# Patient Record
Sex: FEMALE | Race: WHITE | NOT HISPANIC OR LATINO | Employment: UNEMPLOYED | URBAN - METROPOLITAN AREA
[De-identification: names, ages, dates, MRNs, and addresses within clinical notes are randomized per-mention and may not be internally consistent; named-entity substitution may affect disease eponyms.]

---

## 2017-01-26 ENCOUNTER — OFFICE VISIT (OUTPATIENT)
Dept: FAMILY MEDICINE | Facility: CLINIC | Age: 47
End: 2017-01-26
Payer: COMMERCIAL

## 2017-01-26 VITALS
BODY MASS INDEX: 24.59 KG/M2 | RESPIRATION RATE: 16 BRPM | HEIGHT: 64 IN | DIASTOLIC BLOOD PRESSURE: 80 MMHG | TEMPERATURE: 99.1 F | HEART RATE: 68 BPM | OXYGEN SATURATION: 98 % | WEIGHT: 144 LBS | SYSTOLIC BLOOD PRESSURE: 120 MMHG

## 2017-01-26 DIAGNOSIS — Z12.4 SCREENING FOR MALIGNANT NEOPLASM OF CERVIX: ICD-10-CM

## 2017-01-26 DIAGNOSIS — Z01.411 ENCOUNTER FOR GYNECOLOGICAL EXAMINATION WITH ABNORMAL FINDING: Primary | ICD-10-CM

## 2017-01-26 DIAGNOSIS — Z23 NEED FOR PROPHYLACTIC VACCINATION AND INOCULATION AGAINST INFLUENZA: ICD-10-CM

## 2017-01-26 DIAGNOSIS — Z23 NEED FOR DIPHTHERIA-TETANUS-PERTUSSIS (TDAP) VACCINE, ADULT/ADOLESCENT: ICD-10-CM

## 2017-01-26 DIAGNOSIS — F10.20 ALCOHOL DEPENDENCE, CONTINUOUS DRINKING BEHAVIOR (H): ICD-10-CM

## 2017-01-26 PROBLEM — Z13.6 CARDIOVASCULAR SCREENING; LDL GOAL LESS THAN 160: Status: ACTIVE | Noted: 2017-01-26

## 2017-01-26 LAB
ERYTHROCYTE [DISTWIDTH] IN BLOOD BY AUTOMATED COUNT: 16.1 % (ref 10–15)
HCT VFR BLD AUTO: 29.4 % (ref 35–47)
HGB BLD-MCNC: 10.5 G/DL (ref 11.7–15.7)
MCH RBC QN AUTO: 40.1 PG (ref 26.5–33)
MCHC RBC AUTO-ENTMCNC: 35.7 G/DL (ref 31.5–36.5)
MCV RBC AUTO: 112 FL (ref 78–100)
PLATELET # BLD AUTO: 93 10E9/L (ref 150–450)
RBC # BLD AUTO: 2.62 10E12/L (ref 3.8–5.2)
WBC # BLD AUTO: 4.9 10E9/L (ref 4–11)

## 2017-01-26 PROCEDURE — 85027 COMPLETE CBC AUTOMATED: CPT | Performed by: NURSE PRACTITIONER

## 2017-01-26 PROCEDURE — 80061 LIPID PANEL: CPT | Performed by: NURSE PRACTITIONER

## 2017-01-26 PROCEDURE — 82306 VITAMIN D 25 HYDROXY: CPT | Performed by: NURSE PRACTITIONER

## 2017-01-26 PROCEDURE — 90471 IMMUNIZATION ADMIN: CPT | Performed by: NURSE PRACTITIONER

## 2017-01-26 PROCEDURE — 87624 HPV HI-RISK TYP POOLED RSLT: CPT | Performed by: NURSE PRACTITIONER

## 2017-01-26 PROCEDURE — 84443 ASSAY THYROID STIM HORMONE: CPT | Performed by: NURSE PRACTITIONER

## 2017-01-26 PROCEDURE — 90686 IIV4 VACC NO PRSV 0.5 ML IM: CPT | Performed by: NURSE PRACTITIONER

## 2017-01-26 PROCEDURE — G0145 SCR C/V CYTO,THINLAYER,RESCR: HCPCS | Performed by: NURSE PRACTITIONER

## 2017-01-26 PROCEDURE — 36415 COLL VENOUS BLD VENIPUNCTURE: CPT | Performed by: NURSE PRACTITIONER

## 2017-01-26 PROCEDURE — 80053 COMPREHEN METABOLIC PANEL: CPT | Performed by: NURSE PRACTITIONER

## 2017-01-26 PROCEDURE — 90472 IMMUNIZATION ADMIN EACH ADD: CPT | Performed by: NURSE PRACTITIONER

## 2017-01-26 PROCEDURE — 99386 PREV VISIT NEW AGE 40-64: CPT | Mod: 25 | Performed by: NURSE PRACTITIONER

## 2017-01-26 PROCEDURE — 90715 TDAP VACCINE 7 YRS/> IM: CPT | Performed by: NURSE PRACTITIONER

## 2017-01-26 RX ORDER — DIAZEPAM 5 MG
5 TABLET ORAL PRN
COMMUNITY
End: 2017-09-22

## 2017-01-26 NOTE — PROGRESS NOTES
SUBJECTIVE:     CC: Chanda Garvey is an 46 year old woman who presents for preventive health visit.     Healthy Habits:    Do you get at least three servings of calcium containing foods daily (dairy, green leafy vegetables, etc.)? Not enough    Amount of exercise or daily activities, outside of work: 2-3 day(s) per week    Problems taking medications regularly No    Medication side effects: No    Have you had an eye exam in the past two years? no    Do you see a dentist twice per year? Once a year    Do you have sleep apnea, excessive snoring or daytime drowsiness?no      Today's PHQ-2 Score: No flowsheet data found.    Abuse: Current or Past(Physical, Sexual or Emotional)- No  Do you feel safe in your environment - Yes    Social History   Substance Use Topics     Smoking status: Former Smoker -- 1.00 packs/day for 10 years     Types: Cigarettes     Quit date: 05/01/2001     Smokeless tobacco: Not on file     Alcohol Use: Yes      Comment: 5 drinks per week          Standardized Alcohol Screening Questionnaire  AUDIT   Questions 0 1 2 3 4 Score   1. How often do you have a drink  containing alcohol? Never Monthly or less 2 to 4  times a  month 2 to 3  times a  week 4 or more  times a  week  4   2. How many drinks containing alcohol  do you have on a typical day when you are drinking? 1 or 2 3 or 4 5 or 6 7 to 9 10 or more  1   3. How often do you have more than five  or more drinks on one occasion? Never Less  than  monthly Monthly Weekly Daily or  almost  daily  4   4. How often during the last year have  you found that you were not able to stop drinking once you had started? Never Less  than  monthly Monthly Weekly Daily or  almost  daily  3   5. How often during the last year have  you failed to do what was normally expected of you because of drinking? Never Less  than  monthly Monthly Weekly Daily or  almost  daily  3   6. How often during the last year have  you needed a first drink in the morning to  get yourself going after a heavy drinking session? Never Less  than  monthly Monthly Weekly Daily or  almost  daily  3   7. How often during the last year have you had a feeling of guilt or remorse after drinking? Never Less  than  monthly Monthly Weekly Daily or  almost  daily  2   8. How often during the last year have  you been unable to remember what happened the night before because of your drinking? Never Less  than  monthly Monthly Weekly Daily or  almost  daily  2   9. Have you or someone else been  injured because of your drinking? No  Yes, but not in the last year  Yes,  during the  last year  0   10. Has a relative, friend, doctor or other health care worker been concerned about your drinking or suggested you cut down? No  Yes, but not in the last year  Yes,  during the  last year  4   Total  26   Scoring: A score of 7 for adult men is an indication of hazardous drinking (risk for physical or physiological harm); a score of 8 or more is an indication of an alcohol use disorder. A score of 5 or more for adult women  is an indication of hazardous drinking or an alcohol use disorder.       No results for input(s): CHOL, HDL, LDL, TRIG, CHOLHDLRATIO, NHDL in the last 42454 hours.    Reviewed orders with patient.  Reviewed health maintenance and updated orders accordingly - Yes    Sexually active, male  partners,  Would not like std screening.  Is using female surgical for contraception.      Works at [employer] to 6 yo son and 14 yo daughter.     Does not exercise regularly.  Does  not eat a well balanced diet including plenty of fruits, vegetables, calcium and proteins.      Reports she has been drinking a lot of alcohol.  5 or more drinks of vodka daily.  Started escalating 1 year ago whe her son started 1/2 day .  Noticed her eyes were yellow a few days ago.  Sought help through an addiction specialist, taking antabuse and valium for withdrawal.  Sober day #2.   Establishing with a counselor.  ,  " supportive.  Feeling relief that she can talk about was feeling very isolated.  Not eating, drinking all her calories.  Thinks she must be very vitamin deficient.         Mammo Decision Support:  Patient under age 50, mutual decision reflected in health maintenance.      Pertinent mammograms are reviewed under the imaging tab.  History of abnormal Pap smear: NO - age 30-65 PAP every 5 years with negative HPV co-testing recommended  All Histories reviewed and updated in Epic.    ROS:  C: NEGATIVE for fever, chills, change in weight  I: NEGATIVE for worrisome rashes, moles or lesions  E: NEGATIVE for vision changes or irritation  ENT: NEGATIVE for ear, mouth and throat problems  R: NEGATIVE for significant cough or SOB  B: NEGATIVE for masses, tenderness or discharge  CV: NEGATIVE for chest pain, palpitations or peripheral edema  GI: NEGATIVE for nausea, abdominal pain, heartburn, or change in bowel habits  : NEGATIVE for unusual urinary or vaginal symptoms. No vaginal bleeding.  M: NEGATIVE for significant arthralgias or myalgia  N: NEGATIVE for weakness, dizziness or paresthesias  P: NEGATIVE for changes in mood or affect     Problem list, Medication list, Allergies, and Medical/Social/Surgical histories [facility] and updated as appropriate.  Labs reviewed in EPIC  BP Readings from Last 3 Encounters:   01/26/17 120/80   06/06/16 140/96   11/03/04 106/66    Wt Readings from Last 3 Encounters:   01/26/17 144 lb (65.318 kg)   06/06/16 150 lb (68.04 kg)   11/03/04 117 lb 12 oz (53.411 kg)             OBJECTIVE:     /80 mmHg  Pulse 68  Temp(Src) 99.1  F (37.3  C) (Oral)  Resp 16  Ht 5' 4\" (1.626 m)  Wt 144 lb (65.318 kg)  BMI 24.71 kg/m2  SpO2 98%  EXAM:  GENERAL APPEARANCE: healthy, alert and no distress  EYES: Eyes grossly normal to inspection, PERRL and conjunctivae and sclerae Jaundiced  HENT: ear canals and TM's normal, nose and mouth without ulcers or lesions, oropharynx clear and oral " mucous membranes moist  NECK: no adenopathy, no asymmetry, masses, or scars and thyroid normal to palpation  RESP: lungs clear to auscultation - no rales, rhonchi or wheezes  BREAST: normal without masses, tenderness or nipple discharge and no palpable axillary masses or adenopathy  CV: regular rate and rhythm, normal S1 S2, no S3 or S4, no murmur, click or rub, no peripheral edema and peripheral pulses strong  ABDOMEN: soft, nontender, marked hepatosplenomegaly, no masses and bowel sounds normal  MS: no musculoskeletal defects are noted and gait is age appropriate without ataxia  SKIN: face and chest jaundiced. no suspicious lesions or rashes  NEURO: Normal strength and tone, sensory exam grossly normal, mentation intact and speech normal  PSYCH: mentation appears normal and affect normal/bright    ASSESSMENT/PLAN:         ICD-10-CM    1. Encounter for gynecological examination with abnormal finding Z01.411    2. Alcohol dependence, continuous drinking behavior (H) F10.20 TSH with free T4 reflex     Comprehensive metabolic panel     CBC with platelets   3. Need for prophylactic vaccination and inoculation against influenza Z23 FLU VAC, SPLIT VIRUS IM > 3 YO (QUADRIVALENT) [51107]     ADMIN 1st VACCINE   4. Screening for malignant neoplasm of cervix Z12.4 Lipid panel reflex to direct LDL     Vitamin D Deficiency     HPV High Risk Types DNA Cervical     Pap imaged thin layer screen with HPV - recommended age 30 - 65 years (select HPV order below)     CANCELED: GLUCOSE   5. Need for diphtheria-tetanus-pertussis (Tdap) vaccine, adult/adolescent Z23 TDAP (ADACEL AGES 11-64)     Vaccine Administration, Each Additional [02857]       COUNSELING:   Reviewed preventive health counseling, as reflected in patient instructions         reports that she has quit smoking. Her smoking use included Cigarettes. She has a 10 pack-year smoking history. She does not have any smokeless tobacco history on file.    Estimated body mass  "index is 25.36 kg/(m^2) as calculated from the following:    Height as of 6/6/16: 5' 4.5\" (1.638 m).    Weight as of 6/6/16: 150 lb (68.04 kg).      well female, not fasting for labs.  Encouraged to start/establish exercise and healthy diet choices.  Pap done.      Encouraged sobriety, use any available supports, family, friends, counseling etc.  Continue with psychiatrist.      Flu and adacel given.      Counseling Resources:  ATP IV Guidelines  Pooled Cohorts Equation Calculator  Breast Cancer Risk Calculator  FRAX Risk Assessment  ICSI Preventive Guidelines  Dietary Guidelines for Americans, 2010  [employer]'s MyPlate  ASA Prophylaxis  Lung CA Screening    NE Joyner CNP  [facility]    Injectable Influenza Immunization Documentation    1.  Is the person to be vaccinated sick today?  No    2. Does the person to be vaccinated have an allergy to eggs or to a component of the vaccine?  No    3. Has the person to be vaccinated today ever had a serious reaction to influenza vaccine in the past?  No    4. Has the person to be vaccinated ever had Guillain-Finlayson syndrome?  No     Form completed by Nicol Munoz [facility]      Screening Questionnaire for Adult Immunization    Are you sick today?   No   Do you have allergies to medications, food, a vaccine component or latex?   Yes   Have you ever had a serious reaction after receiving a vaccination?   No   Do you have a long-term health problem with heart disease, lung disease, asthma, kidney disease, metabolic disease (e.g. diabetes), anemia, or other blood disorder?   No   Do you have cancer, leukemia, HIV/AIDS, or any other immune system problem?   No   In the past 3 months, have you taken medications that affect  your immune system, such as prednisone, other steroids, or anticancer drugs; drugs for the treatment of rheumatoid arthritis, Crohn s disease, or psoriasis; or have you had radiation treatments?   No   Have you had a seizure, or a brain " or other nervous system problem?   No   During the past year, have you received a transfusion of blood or blood     products, or been given immune (gamma) globulin or antiviral drug?   No   For women: Are you pregnant or is there a chance you could become        pregnant during the next month?   No   Have you received any vaccinations in the past 4 weeks?   No     Immunization questionnaire was positive for at least one answer.  Notified oJnah.      MNVFC doesn't apply on this patient    Per orders of Dr. Mckenzie, injection of Adacel given by Nicol Munoz. Patient instructed to remain in clinic for 20 minutes afterwards, and to report any adverse reaction to me immediately.  VIS given     Screening performed by Nicol Munoz on 1/26/2017 at 1:43 PM.

## 2017-01-26 NOTE — NURSING NOTE
"Chief Complaint   Patient presents with     Physical       Initial /80 mmHg  Pulse 68  Temp(Src) 99.1  F (37.3  C) (Oral)  Resp 16  Ht 5' 4\" (1.626 m)  Wt 144 lb (65.318 kg)  BMI 24.71 kg/m2  SpO2 98% Estimated body mass index is 24.71 kg/(m^2) as calculated from the following:    Height as of this encounter: 5' 4\" (1.626 m).    Weight as of this encounter: 144 lb (65.318 kg).  BP completed using cuff size: marissa Munoz CMA      "

## 2017-01-27 LAB
ALBUMIN SERPL-MCNC: 2.7 G/DL (ref 3.4–5)
ALP SERPL-CCNC: 292 U/L (ref 40–150)
ALT SERPL W P-5'-P-CCNC: 80 U/L (ref 0–50)
ANION GAP SERPL CALCULATED.3IONS-SCNC: 11 MMOL/L (ref 3–14)
AST SERPL W P-5'-P-CCNC: 366 U/L (ref 0–45)
BILIRUB SERPL-MCNC: 13 MG/DL (ref 0.2–1.3)
BUN SERPL-MCNC: 4 MG/DL (ref 7–30)
CALCIUM SERPL-MCNC: 8.4 MG/DL (ref 8.5–10.1)
CHLORIDE SERPL-SCNC: 93 MMOL/L (ref 94–109)
CHOLEST SERPL-MCNC: 151 MG/DL
CO2 SERPL-SCNC: 29 MMOL/L (ref 20–32)
CREAT SERPL-MCNC: 0.48 MG/DL (ref 0.52–1.04)
DEPRECATED CALCIDIOL+CALCIFEROL SERPL-MC: 36 UG/L (ref 20–75)
GFR SERPL CREATININE-BSD FRML MDRD: ABNORMAL ML/MIN/1.7M2
GLUCOSE SERPL-MCNC: 127 MG/DL (ref 70–99)
HDLC SERPL-MCNC: 18 MG/DL
LDLC SERPL CALC-MCNC: 85 MG/DL
NONHDLC SERPL-MCNC: 133 MG/DL
POTASSIUM SERPL-SCNC: 3.2 MMOL/L (ref 3.4–5.3)
PROT SERPL-MCNC: 7.5 G/DL (ref 6.8–8.8)
SODIUM SERPL-SCNC: 133 MMOL/L (ref 133–144)
TRIGL SERPL-MCNC: 240 MG/DL
TSH SERPL DL<=0.005 MIU/L-ACNC: 3.77 MU/L (ref 0.4–4)

## 2017-01-31 LAB
COPATH REPORT: NORMAL
PAP: NORMAL

## 2017-02-01 LAB
FINAL DIAGNOSIS: NORMAL
HPV HR 12 DNA CVX QL NAA+PROBE: NEGATIVE
HPV16 DNA SPEC QL NAA+PROBE: NEGATIVE
HPV18 DNA SPEC QL NAA+PROBE: NEGATIVE
SPECIMEN DESCRIPTION: NORMAL

## 2017-02-03 ENCOUNTER — OFFICE VISIT (OUTPATIENT)
Dept: FAMILY MEDICINE | Facility: CLINIC | Age: 47
End: 2017-02-03
Payer: COMMERCIAL

## 2017-02-03 ENCOUNTER — TELEPHONE (OUTPATIENT)
Dept: FAMILY MEDICINE | Facility: CLINIC | Age: 47
End: 2017-02-03

## 2017-02-03 VITALS
RESPIRATION RATE: 16 BRPM | DIASTOLIC BLOOD PRESSURE: 70 MMHG | HEIGHT: 64 IN | TEMPERATURE: 98 F | BODY MASS INDEX: 24.75 KG/M2 | SYSTOLIC BLOOD PRESSURE: 112 MMHG | WEIGHT: 145 LBS | HEART RATE: 70 BPM

## 2017-02-03 DIAGNOSIS — E87.6 HYPOKALEMIA: ICD-10-CM

## 2017-02-03 DIAGNOSIS — R17 JAUNDICE: Primary | ICD-10-CM

## 2017-02-03 DIAGNOSIS — D64.9 ANEMIA, UNSPECIFIED TYPE: ICD-10-CM

## 2017-02-03 DIAGNOSIS — D69.6 THROMBOCYTOPENIA (H): ICD-10-CM

## 2017-02-03 DIAGNOSIS — R17 JAUNDICE: ICD-10-CM

## 2017-02-03 DIAGNOSIS — L29.9 PRURITIC DISORDER: ICD-10-CM

## 2017-02-03 DIAGNOSIS — K70.11 ALCOHOLIC HEPATITIS WITH ASCITES (H): ICD-10-CM

## 2017-02-03 LAB
ALBUMIN SERPL-MCNC: 2.3 G/DL (ref 3.4–5)
ALBUMIN UR-MCNC: NEGATIVE MG/DL
ALP SERPL-CCNC: 220 U/L (ref 40–150)
ALT SERPL W P-5'-P-CCNC: 33 U/L (ref 0–50)
AMMONIA PLAS-SCNC: 27 UMOL/L (ref 10–50)
ANION GAP SERPL CALCULATED.3IONS-SCNC: 8 MMOL/L (ref 3–14)
APPEARANCE UR: CLEAR
APTT PPP: 37 SEC (ref 22–37)
AST SERPL W P-5'-P-CCNC: 110 U/L (ref 0–45)
BETA HCG QUAL IFA URINE: NEGATIVE
BILIRUB DIRECT SERPL-MCNC: 7.5 MG/DL (ref 0–0.2)
BILIRUB SERPL-MCNC: 8.4 MG/DL (ref 0.2–1.3)
BILIRUB UR QL STRIP: ABNORMAL
BUN SERPL-MCNC: 4 MG/DL (ref 7–30)
CALCIUM SERPL-MCNC: 8.3 MG/DL (ref 8.5–10.1)
CHLORIDE SERPL-SCNC: 100 MMOL/L (ref 94–109)
CO2 SERPL-SCNC: 27 MMOL/L (ref 20–32)
COLOR UR AUTO: YELLOW
CREAT SERPL-MCNC: 0.46 MG/DL (ref 0.52–1.04)
ERYTHROCYTE [DISTWIDTH] IN BLOOD BY AUTOMATED COUNT: 15.2 % (ref 10–15)
FERRITIN SERPL-MCNC: 380 NG/ML (ref 8–252)
GFR SERPL CREATININE-BSD FRML MDRD: ABNORMAL ML/MIN/1.7M2
GLUCOSE SERPL-MCNC: 96 MG/DL (ref 70–99)
GLUCOSE UR STRIP-MCNC: NEGATIVE MG/DL
HCT VFR BLD AUTO: 30.4 % (ref 35–47)
HGB BLD-MCNC: 10.2 G/DL (ref 11.7–15.7)
HGB UR QL STRIP: NEGATIVE
INR PPP: 1.29 (ref 0.86–1.14)
IRON SERPL-MCNC: 52 UG/DL (ref 35–180)
KETONES UR STRIP-MCNC: NEGATIVE MG/DL
LEUKOCYTE ESTERASE UR QL STRIP: ABNORMAL
MCH RBC QN AUTO: 40.8 PG (ref 26.5–33)
MCHC RBC AUTO-ENTMCNC: 33.6 G/DL (ref 31.5–36.5)
MCV RBC AUTO: 122 FL (ref 78–100)
NITRATE UR QL: NEGATIVE
PH UR STRIP: 6.5 PH (ref 5–7)
PLATELET # BLD AUTO: 205 10E9/L (ref 150–450)
POTASSIUM SERPL-SCNC: 3.4 MMOL/L (ref 3.4–5.3)
PROT SERPL-MCNC: 7.3 G/DL (ref 6.8–8.8)
RBC # BLD AUTO: 2.5 10E12/L (ref 3.8–5.2)
RBC #/AREA URNS AUTO: NORMAL /HPF (ref 0–2)
SODIUM SERPL-SCNC: 135 MMOL/L (ref 133–144)
SP GR UR STRIP: 1.01 (ref 1–1.03)
URN SPEC COLLECT METH UR: ABNORMAL
UROBILINOGEN UR STRIP-ACNC: 0.2 EU/DL (ref 0.2–1)
WBC # BLD AUTO: 6.8 10E9/L (ref 4–11)
WBC #/AREA URNS AUTO: NORMAL /HPF (ref 0–2)

## 2017-02-03 PROCEDURE — 84703 CHORIONIC GONADOTROPIN ASSAY: CPT | Performed by: FAMILY MEDICINE

## 2017-02-03 PROCEDURE — 81001 URINALYSIS AUTO W/SCOPE: CPT | Performed by: FAMILY MEDICINE

## 2017-02-03 PROCEDURE — 99215 OFFICE O/P EST HI 40 MIN: CPT | Performed by: FAMILY MEDICINE

## 2017-02-03 RX ORDER — FUROSEMIDE 20 MG
20 TABLET ORAL DAILY
Qty: 30 TABLET | Refills: 0 | Status: SHIPPED | OUTPATIENT
Start: 2017-02-03 | End: 2017-03-29

## 2017-02-03 RX ORDER — DOXEPIN HYDROCHLORIDE 25 MG/1
25-50 CAPSULE ORAL AT BEDTIME
Qty: 60 CAPSULE | Refills: 0 | Status: SHIPPED | OUTPATIENT
Start: 2017-02-03 | End: 2017-02-03

## 2017-02-03 RX ORDER — SPIRONOLACTONE 25 MG/1
12.5 TABLET ORAL DAILY
Qty: 15 TABLET | Refills: 0 | Status: SHIPPED | OUTPATIENT
Start: 2017-02-03 | End: 2017-03-29

## 2017-02-03 RX ORDER — DOXEPIN HYDROCHLORIDE 10 MG/1
CAPSULE ORAL
Qty: 60 CAPSULE | Refills: 0 | Status: SHIPPED | OUTPATIENT
Start: 2017-02-03 | End: 2017-09-22

## 2017-02-03 NOTE — TELEPHONE ENCOUNTER
I sent a [employer] message, but want to make sure she gets the message about a med change--I prescribed two meds for the swelling, spironolactone and lasix.  Because her potassium was a bit low last check, I just want her to do the spironolactone over the weekend.  Hold off on lasix for now, but we could add it later if needed and if potassium is ok.  Thanks.

## 2017-02-03 NOTE — MR AVS SNAPSHOT
"              After Visit Summary   2/3/2017    Chanda Garvey    MRN: [id number]           Patient Information     Date Of Birth          1970        Visit Information        Provider Department      2/3/2017 2:20 PM Antoinette Avilez MD [facility]        Today's Diagnoses     Pruritic disorder    -  1     Alcoholic hepatitis with ascites         Jaundice           Care Instructions    -most of your lab results will be in over the weekend; I will send you a mychart note or call you.  Some of the autoimmune tests take a little longer  -the Ultrasound report should be in within 24 hours of the test; I will send a mychart note or call you  -referral is done for Hepatology  -doxepin is ordered for itching--you may take 1 tab (10mg) three times daily, or you may take up to three tabs at bedtime.  If you do start on prozac, please run this by your psychiatrist, as there can be interactions between prozac and doxepin.     -for the ascites (fluid in your abdomen): if this is becoming uncomfortable/painful, the fluid can be drained by an ER doctor or a radiologist.  Otherwise, we could try treating this with diuretics (\"water pills\").  These medications (Spironolactone and Lasix) also lower blood pressure, so watch for any dizziness and it's ok to just stop them and let me know.  They can also mess up your electrolytes (sodium, potassium), so I need to recheck your blood work in 1-2 weeks, so please come back for a visit.              Follow-ups after your visit        Additional Services     GASTROENTEROLOGY ADULT REF CONSULT ONLY       Preferred Location: [facility], [facility] [phone]      Please be aware that coverage of these services is subject to the terms and limitations of your health insurance plan.  Call member services at your health plan with any benefit or coverage questions.  Any procedures must be performed at a [facility] facility OR coordinated by your clinic's referral " office.    Please bring the following with you to your appointment:    (1) Any X-Rays, CTs or MRIs which have been performed.  Contact the facility where they were done to arrange for  prior to your scheduled appointment.    (2) List of current medications   (3) This referral request   (4) Any documents/labs given to you for this referral                  Your next 10 appointments already scheduled     Feb 03, 2017  5:00 PM   US ABDOMEN COMPLETE with [id number]   [facility] ([facility])    [address] [address] [phone]           Please bring a list of your medicines (including vitamins, minerals and over-the-counter drugs). Also, tell your doctor about any allergies you may have. Wear comfortable clothes and leave your valuables at home.  Adults: No eating or drinking for 8 hours before the exam. You may take medicine with a small sip of water.  Children: - Children 6+ years: No food or drink for 6 hours before exam. - Children 1-5 years: No food or drink for 4 hours before exam. - Infants, breast-fed: may have breast milk up to 2 hours before exam. - Infants, formula: may have bottle until 4 hours before exam.  Please call the Imaging Department at your exam site with any questions.              Future tests that were ordered for you today     Open Future Orders        Priority Expected Expires Ordered    US Abdomen Complete Routine  2/3/2018 2/3/2017            Who to contact     If you have questions or need follow up information about today's clinic visit or your schedule please contact [facility] directly at [phone].  Normal or non-critical lab and imaging results will be communicated to you by MyChart, letter or phone within 4 business days after the clinic has received the results. If you do not hear from us within 7 days, please contact the clinic through MyChart or phone. If you have a critical or  "abnormal lab result, we will notify you by phone as soon as possible.  Submit refill requests through [employer] or call your pharmacy and they will forward the refill request to us. Please allow 3 business days for your refill to be completed.          Additional Information About Your Visit        [employer] Information     [employer] gives you secure access to your electronic health record. If you see a primary care provider, you can also send messages to your care team and make appointments. If you have questions, please call your primary care clinic.  If you do not have a primary care provider, please call [phone] and they will assist you.        Care EveryWhere ID     This is your Care EveryWhere ID. This could be used by other organizations to access your [facility] medical records  [id number]        Your Vitals Were     Pulse Temperature Respirations Height BMI (Body Mass Index) Last Period    70 98  F (36.7  C) (Oral) 16 5' 4\" (1.626 m) 24.88 kg/m2 12/23/2016    Breastfeeding?                   No            Blood Pressure from Last 3 Encounters:   02/03/17 112/70   01/26/17 120/80   06/06/16 140/96    Weight from Last 3 Encounters:   02/03/17 145 lb (65.772 kg)   01/26/17 144 lb (65.318 kg)   06/06/16 150 lb (68.04 kg)              We Performed the Following     *UA reflex to Microscopic and Culture ([facility] and [facility] (except [place] and [place])     Ammonia     Antinuclear antibody screen by EIA     Beta HCG qual IFA urine     CBC with platelets     Ceruloplasmin     Comprehensive metabolic panel     F Actin EAI with reflex     GASTROENTEROLOGY ADULT REF CONSULT ONLY     Hepatitis A Antibody IgG     Hepatitis B Surface Antibody     Hepatitis B surface antigen     Hepatitis C antibody     INR     Liver kidney microsomal antibody IgG     Partial thromboplastin time          Today's Medication Changes          These changes are accurate as of: 2/3/17  3:23 PM.  If you have any questions, " ask your nurse or doctor.               Start taking these medicines.        Dose/Directions    doxepin 10 MG capsule   Commonly known as:  SINEquan   Used for:  Pruritic disorder   Started by:  Antoinette Avilez MD        1 tab by mouth three times daily as needed OR 1-3 tabs by mouth at bedtime   Quantity:  60 capsule   Refills:  0       furosemide 20 MG tablet   Commonly known as:  LASIX   Used for:  Alcoholic hepatitis with ascites   Started by:  Antoinette Avilez MD        Dose:  20 mg   Take 1 tablet (20 mg) by mouth daily   Quantity:  30 tablet   Refills:  0       spironolactone 25 MG tablet   Commonly known as:  ALDACTONE   Used for:  Alcoholic hepatitis with ascites   Started by:  Antoinette Avilez MD        Dose:  12.5 mg   Take 0.5 tablets (12.5 mg) by mouth daily   Quantity:  15 tablet   Refills:  0         Stop taking these medicines if you haven't already. Please contact your care team if you have questions.     PROZAC 20 MG capsule   Generic drug:  FLUoxetine   Stopped by:  Antoinette Avilez MD                Where to get your medicines      These medications were sent to [facility] - [place], [place] - [address]  [address], [place] [address]     Phone:  [phone]    - doxepin 10 MG capsule  - furosemide 20 MG tablet  - spironolactone 25 MG tablet             Primary Care Provider Office Phone # Fax #    Radha Avina NE Mckenzie [phone]       [facility] [address] [facility] [address]        Thank you!     Thank you for choosing [facility]  for your care. Our goal is always to provide you with excellent care. Hearing back from our patients is one way we can continue to improve our services. Please take a few minutes to complete the written survey that you may receive in the mail after your visit with us. Thank you!             Your Updated Medication List - Protect others around you: Learn how to  safely use, store and throw away your medicines at www.disposemymeds.org.          This list is accurate as of: 2/3/17  3:23 PM.  Always use your most recent med list.                   Brand Name Dispense Instructions for use    doxepin 10 MG capsule    SINEquan    60 capsule    1 tab by mouth three times daily as needed OR 1-3 tabs by mouth at bedtime       furosemide 20 MG tablet    LASIX    30 tablet    Take 1 tablet (20 mg) by mouth daily       spironolactone 25 MG tablet    ALDACTONE    15 tablet    Take 0.5 tablets (12.5 mg) by mouth daily       VALIUM 5 MG tablet   Generic drug:  diazepam      Take 5 mg by mouth as needed for anxiety or sleep

## 2017-02-03 NOTE — PROGRESS NOTES
SUBJECTIVE:                                                    Chanda Garvey is a 46 year old female who presents to clinic today for the following health issues:    CC:  45 yo F new to me presents for f/u of lab results and presumed alcoholic hepatitis with ascites and jaundice.      HPI: The patient states that, over the past one year, her alcohol intake started to escalate, up to 5+ vodka drinks daily.  She states that she suddenly had yellow discoloration around her eyes, fatigue, pruritis and generalized swelling worse in her abdomen a little over a week ago.  She came in for her routine physical, at which time labs were run, showing anemia, thrombocytopenia, elevated LFTs, and hyperbilirubinemia.  She stopped drinking 9 days ago.  She sought treatment through an  Addiction specialist; she sees her psychologist regularly, and she also has a psychiatrist at the same office.  She was started on naltrexone and valium.  She experienced some chills/sweats and some anxiety, but now is feeling much better . She no longer takes naltrexone.  She occasionally takes valium once daily, usually around dinner time, when she would normally crave alcohol, but she is not taking this every day.  She states she feels relieved that she has had to stop drinking.  She states she was also prescribed baclofen, which she hasn't tried but thinks she might, doxepin (which she lost or never picked up), and prozac (which she never started).  She states that she feels her mood is stable.  She had a little anxiety initially with alcohol cessation, but now feels better, and she denies any depressed mood or anhedonia.  She reports generalized pruritis and a rash that is spreading; she would like to have the doxepin to try for this.  She reports easy bruising and some bleeding of her gums.  Her periods are irregular, and the last period was heavier than usual.  She is s/p tubal ligation.  She denies any black, bloody, or acholic  stools.  No current nausea, no vomiting or hematemesis.  No GERD/heartburn.  She notes that her urine had turned very dark, but that over the past few days, it has been yellow.  She reports she had not been eating well when she was drinking heavily, but now she is trying to eat a regular diet.  The ascites is not painful nor causing any shortness of breath; she thinks her baseline weight was 135.  She has had no abdominal pain at all.  She denies any headaches or dizziness. She did have some blurry vision which has improved.  No rhinorrhea or sore throat, and no dysphagia.  No numbness or tingling.  No joint pains.  No rash other than the pruritic red spots to her thorax.  Her family history is significant for lupus in her mother, diabetes in a brother, and an autoimmune liver disease in another brother.  There is no family h/o substance abuse .   She is  and has a 12 yo son and a 6 yo son.    She is a former smoker, quit 14 years ago.    She is motivated to become healthy.  She reports she feels well-supported.            Problem list and histories reviewed & adjusted, as indicated.  Additional history: as documented    Patient Active Problem List   Diagnosis     CARDIOVASCULAR SCREENING; LDL GOAL LESS THAN 160     Past Surgical History   Procedure Laterality Date     C nonspecific procedure  03            Social History   Substance Use Topics     Smoking status: Former Smoker -- 1.00 packs/day for 10 years     Types: Cigarettes     Quit date: 2001     Smokeless tobacco: Not on file     Alcohol Use: Yes      Comment: 5 drinks per day, sober since 2017     Family History   Problem Relation Age of Onset     DIABETES Brother      adult onset, on insulin     OSTEOPOROSIS Mother      Blood Disease Mother      Lupus     Coronary Artery Disease Mother 72     MI         Current Outpatient Prescriptions   Medication Sig Dispense Refill     doxepin (SINEQUAN) 10 MG capsule 1 tab by mouth three  "times daily as needed OR 1-3 tabs by mouth at bedtime 60 capsule 0     spironolactone (ALDACTONE) 25 MG tablet Take 0.5 tablets (12.5 mg) by mouth daily 15 tablet 0     furosemide (LASIX) 20 MG tablet Take 1 tablet (20 mg) by mouth daily 30 tablet 0     diazepam (VALIUM) 5 MG tablet Take 5 mg by mouth as needed for anxiety or sleep       [DISCONTINUED] doxepin (SINEQUAN) 25 MG capsule Take 1-2 capsules (25-50 mg) by mouth At Bedtime 60 capsule 0     Allergies   Allergen Reactions     Penicillins      Sulfa Drugs        ROS:  Constitutional, HEENT, cardiovascular, pulmonary, gi and gu systems are negative, except as otherwise noted.    OBJECTIVE:                                                    /70 mmHg  Pulse 70  Temp(Src) 98  F (36.7  C) (Oral)  Resp 16  Ht 5' 4\" (1.626 m)  Wt 145 lb (65.772 kg)  BMI 24.88 kg/m2  LMP 12/23/2016  Breastfeeding? No  Body mass index is 24.88 kg/(m^2).  GENERAL: appears fatigued and jaundiced; not toxic.  EYES: scleral icterus.  PERRL, EOMI.  No Kayser Fleischer rings.  HENT: ear canals and TM's normal, nose and mouth without ulcers or lesions; there is sublingual jaundice  NECK: no adenopathy, no asymmetry, masses, or scars and thyroid normal to palpation  RESP: lungs clear to auscultation - no rales, rhonchi or wheezes  CV: regular rate and rhythm, normal S1 S2, no S3 or S4, no murmur, click or rub, no peripheral edema and peripheral pulses strong  ABDOMEN: mild to moderate ascites with fluid wave, not tense, abd is soft and nontender, liver percusses enlarged, the spleen may be mildly enlarged but more difficult to palpate.  Normal bowel sounds.  No guarding or rebound.    MS: no gross musculoskeletal defects noted, no edema; the extremities are thin.  There is swelling in her hands and fingers.  SKIN: jaundice to sclerae, face, neck, chest.  Erythematous papules to thorax.  No xanthelasma.   NEURO: Normal strength and tone, mentation intact and speech normal, " negative asterixis, normal finger to nose with the right hand, some mild dysmetria with the left finger, normal heel to shin.    PSYCH: mentation appears normal, affect normal/bright    Diagnostic Test Results:  See orders     ASSESSMENT/PLAN:                                                    This is an otherwise healthy 47 yo woman who presents with acute jaundice and ascites in the setting of heavy alcohol use over the past year, found to have ALT just mildly elevated at 80, AST elevated at 366, alk phos elevated at 292, and total bilirubin of 13.0.  Her hepatitis is most likely to be alcoholic in etiology, however she does have a very strong family history of autoimmune disease, including autoimmune liver disease; it is possible that she has an underlying autoimmune liver disease that has manifested with the catalyst of alcohol.  I have put in labs to evaluate further for autoimmune and viral causes, hemachromatosis, and Wilsons disease.  I will also check lipase, though pancreatitis is unlikely without any epigastric pain, nausea or vomiting.  Ascending cholangitis is unlikely without RUQ pain or fever.  Biliary obstruction (stones, pancreatic malignancy, biliary malignancy) also seems less likely but a possibility.  I have ordered an ultrasound as well.  These studies will be done tonight . I have referred her for specialist consultation with hepatology; if any of her studies come back suggestive of autoimmune or other etiology, or if her symptoms are getting worse, I will consult hepatology over the phone or request an appt asap (otherwise she is scheduled in about 3 weeks).  She has anemia and thrombocytopenia, which are likely secondary to liver disease.  Her protein is low, secondary to liver disease and poor nutritional status.  I will also check on her coag studies.  I discussed that the ascites will further be evaluated with the ultrasound, and that treatment options could include paracentesis or  diuretics, though she could defer given that she does not have any discomfort related to this . She would like to try diuretics, so will try some spironolactone over the weekend, and can add lasix pending her electrolyte results (mild hypokalemia last time).  Discussed symptoms of hypotension which could result with use of diuretics.  I prescribed doxepin, which she can use 10mg TID during the day, or she can just use up to 30mg at night for pruritis.  She should continue to follow up with her mental health professionals.  I discussed that if she does decide to start the prozac, she should alert her psychiatrist, and I discussed that doxepin and prozac may interact.      Orders Placed This Encounter   Procedures     US Abdomen Complete     *UA reflex to Microscopic and Culture ([facility] (except [place] and [place])     Beta HCG qual IFA urine     Urine Microscopic     Comprehensive metabolic panel     CBC with platelets     INR     Partial thromboplastin time     Liver kidney microsomal antibody IgG     F Actin EAI with reflex     Antinuclear antibody screen by EIA     Ceruloplasmin     Hepatitis B Surface Antibody     Hepatitis B surface antigen     Hepatitis C antibody     Hepatitis A Antibody IgG     Ammonia     Iron     Ferritin     Bilirubin direct     HCL MITOCHONDRIAL AB W/REFLEX     Lipase     GASTROENTEROLOGY ADULT REF CONSULT ONLY     Antoinette Avilez MD  [facility]

## 2017-02-03 NOTE — PATIENT INSTRUCTIONS
"-most of your lab results will be in over the weekend; I will send you a mychart note or call you.  Some of the autoimmune tests take a little longer  -the Ultrasound report should be in within 24 hours of the test; I will send a mychart note or call you  -referral is done for Hepatology  -doxepin is ordered for itching--you may take 1 tab (10mg) three times daily, or you may take up to three tabs at bedtime.  If you do start on prozac, please run this by your psychiatrist, as there can be interactions between prozac and doxepin.     -for the ascites (fluid in your abdomen): if this is becoming uncomfortable/painful, the fluid can be drained by an ER doctor or a radiologist.  Otherwise, we could try treating this with diuretics (\"water pills\").  These medications (Spironolactone and Lasix) also lower blood pressure, so watch for any dizziness and it's ok to just stop them and let me know.  They can also mess up your electrolytes (sodium, potassium), so I need to recheck your blood work in 1-2 weeks, so please come back for a visit.        "

## 2017-02-04 LAB — LIPASE SERPL-CCNC: 114 U/L (ref 73–393)

## 2017-02-06 LAB
ANA SER QL IA: NORMAL
CERULOPLASMIN SERPL-MCNC: 42 MG/DL (ref 23–53)
HAV IGG SER QL IA: ABNORMAL
HAV IGM SERPL QL IA: NORMAL
HBV SURFACE AB SERPL IA-ACNC: 0.5 M[IU]/ML
HBV SURFACE AG SERPL QL IA: NONREACTIVE
HCV AB SERPL QL IA: ABNORMAL

## 2017-02-07 LAB
MITOCHONDRIA M2 IGG SER-ACNC: 1 U/ML
SMA IGG SER-ACNC: 19

## 2017-02-08 LAB — LKM AB TITR SER IF: NORMAL {TITER}

## 2017-02-13 ENCOUNTER — OFFICE VISIT (OUTPATIENT)
Dept: GASTROENTEROLOGY | Facility: CLINIC | Age: 47
End: 2017-02-13
Attending: INTERNAL MEDICINE
Payer: COMMERCIAL

## 2017-02-13 VITALS
OXYGEN SATURATION: 100 % | SYSTOLIC BLOOD PRESSURE: 110 MMHG | HEIGHT: 64 IN | WEIGHT: 142.3 LBS | BODY MASS INDEX: 24.3 KG/M2 | TEMPERATURE: 98.4 F | DIASTOLIC BLOOD PRESSURE: 76 MMHG | HEART RATE: 85 BPM

## 2017-02-13 DIAGNOSIS — K70.9 LIVER DISEASE, CHRONIC, DUE TO ALCOHOL (H): Primary | ICD-10-CM

## 2017-02-13 DIAGNOSIS — R17 JAUNDICE: ICD-10-CM

## 2017-02-13 LAB
ALBUMIN SERPL-MCNC: 2.4 G/DL (ref 3.4–5)
ALP SERPL-CCNC: 152 U/L (ref 40–150)
ALT SERPL W P-5'-P-CCNC: 42 U/L (ref 0–50)
ANION GAP SERPL CALCULATED.3IONS-SCNC: 9 MMOL/L (ref 3–14)
AST SERPL W P-5'-P-CCNC: 110 U/L (ref 0–45)
BILIRUB SERPL-MCNC: 4 MG/DL (ref 0.2–1.3)
BUN SERPL-MCNC: 6 MG/DL (ref 7–30)
CALCIUM SERPL-MCNC: 9.2 MG/DL (ref 8.5–10.1)
CHLORIDE SERPL-SCNC: 103 MMOL/L (ref 94–109)
CO2 SERPL-SCNC: 29 MMOL/L (ref 20–32)
CREAT SERPL-MCNC: 0.59 MG/DL (ref 0.52–1.04)
GFR SERPL CREATININE-BSD FRML MDRD: ABNORMAL ML/MIN/1.7M2
GLUCOSE SERPL-MCNC: 116 MG/DL (ref 70–99)
POTASSIUM SERPL-SCNC: 4 MMOL/L (ref 3.4–5.3)
PROT SERPL-MCNC: 7.7 G/DL (ref 6.8–8.8)
SODIUM SERPL-SCNC: 141 MMOL/L (ref 133–144)

## 2017-02-13 PROCEDURE — 36415 COLL VENOUS BLD VENIPUNCTURE: CPT | Performed by: FAMILY MEDICINE

## 2017-02-13 PROCEDURE — 80053 COMPREHEN METABOLIC PANEL: CPT | Performed by: FAMILY MEDICINE

## 2017-02-13 PROCEDURE — 99212 OFFICE O/P EST SF 10 MIN: CPT | Mod: ZF

## 2017-02-13 PROCEDURE — 87522 HEPATITIS C REVRS TRNSCRPJ: CPT | Performed by: FAMILY MEDICINE

## 2017-02-13 PROCEDURE — 86803 HEPATITIS C AB TEST: CPT | Performed by: FAMILY MEDICINE

## 2017-02-13 ASSESSMENT — PAIN SCALES - GENERAL: PAINLEVEL: NO PAIN (0)

## 2017-02-13 NOTE — PROGRESS NOTES
HISTORY OF PRESENT ILLNESS:  I had the pleasure of seeing Chanda Garvey for consultation in the Liver Clinic at the [facility] on 02/13/2017.  Ms. Garvey presents for consultation regarding jaundice and a diagnosis of alcoholic liver disease.      Beginning a little more than 1 month ago, she noted the onset of jaundice and dark urine.  She ultimately came to medical attention where she was found to have elevated transaminases, AST much greater than ALT and a bilirubin about 7.  It eventually went up to as high as 13.8 before she was told to completely stop drinking.  She reports that she went through a brief period of alcohol withdrawal, which was managed with Valium successfully.  She also sought out counseling and does meet with a counselor once a week.  She has not drank anything now for more than 1 month.      She denies any abdominal pain.  She does complain of some increased abdominal girth.  She does complain of fairly significant itching and a rash related to the itching.  She does say that she responds fairly well to Benadryl and doxepin in terms of the itching.  She denies any fevers or chills, cough or shortness of breath.  She denies any nausea or vomiting and denies any diarrhea or constipation.  She says her appetite is good and her weight is up a bit.      PAST MEDICAL HISTORY:   She has had previous C-sections.  She has no other medical problems.        MEDICATIONS:  She was put on Lasix, spironolactone, and she is on prednisone as well.        SOCIAL HISTORY:  She is not a smoker.  She has no risk factors for hepatitis C.  She has been worked up for other etiologies of liver disease and none were apparent.      FAMILY HISTORY:  She has a brother who has autoimmune liver disease and is on the wait list for liver transplantation.      REVIEW OF SYSTEMS:  Complete review of systems was negative other than that noted above.       Current Outpatient Prescriptions   Medication      DiphenhydrAMINE HCl (BENADRYL PO)     predniSONE (DELTASONE) 20 MG tablet     doxepin (SINEQUAN) 10 MG capsule     spironolactone (ALDACTONE) 25 MG tablet     diazepam (VALIUM) 5 MG tablet     furosemide (LASIX) 20 MG tablet     No current facility-administered medications for this visit.      B/P: 110/76, T: 98.4, P: 85, R: Data Unavailable    HEENT exam shows mild scleral icterus.  She has no temporal muscle wasting.  Her neck is without thyromegaly.  Her chest is clear.  Cardiac exam reveals no S3, S4, murmur.  Her abdominal exam shows no obvious ascites.  Her liver is 13-14 cm in span with a very prominent left lobe.  No spleen tip is palpable.  Extremity exam shows no edema.  Skin exam shows some palmar erythema as well as spider angioma and numerous excoriations.  Neurologic exam shows no asterixis.       Recent Results (from the past 168 hour(s))   Comprehensive metabolic panel    Collection Time: 02/13/17  1:22 PM   Result Value Ref Range    Sodium 141 133 - 144 mmol/L    Potassium 4.0 3.4 - 5.3 mmol/L    Chloride 103 94 - 109 mmol/L    Carbon Dioxide 29 20 - 32 mmol/L    Anion Gap 9 3 - 14 mmol/L    Glucose 116 (H) 70 - 99 mg/dL    Urea Nitrogen 6 (L) 7 - 30 mg/dL    Creatinine 0.59 0.52 - 1.04 mg/dL    GFR Estimate >90  Non  GFR Calc   >60 mL/min/1.7m2    GFR Estimate If Black >90   GFR Calc   >60 mL/min/1.7m2    Calcium 9.2 8.5 - 10.1 mg/dL    Bilirubin Total 4.0 (H) 0.2 - 1.3 mg/dL    Albumin 2.4 (L) 3.4 - 5.0 g/dL    Protein Total 7.7 6.8 - 8.8 g/dL    Alkaline Phosphatase 152 (H) 40 - 150 U/L    ALT 42 0 - 50 U/L     (H) 0 - 45 U/L      My impression is that Ms. Garvey has alcoholic liver disease which is markedly improved since December.  I will not be making any change to her medical regimen.  I did discuss at length with her the fact that she really has to completely abstain from alcohol now for the rest of her life.   She seems to be committed to long-term  sobriety and came to the clinic appointment with her .  I will see her back in the clinic in 6 weeks, at which time we will discuss further our long-term plans for management.  It is unclear as to whether she has cirrhosis and at least for the time being, I will treat her as such.  She will get ultrasounds every 6 months.  Will make sure she is up-to-date on her vaccination schedule.  I am gratified that she is in treatment regimen and I have offered her Baclofen to address cravings.       Thank you very much for allowing me to participate in the care of this patient.  If you have any questions regarding my recommendations, please do not hesitate to contact me.       Bienvenido Riley MD     Professor of Medicine  [employer]     Executive Medical Director, Solid Organ Transplant Program  [facility]

## 2017-02-13 NOTE — LETTER
2/13/2017      RE: Chanda Garvey  [address] [id number]       HISTORY OF PRESENT ILLNESS:  I had the pleasure of seeing Chanda Garvey for consultation in the Liver Clinic at the [facility] on 02/13/2017.  Ms. Garvey presents for consultation regarding jaundice and a diagnosis of alcoholic liver disease.      Beginning a little more than 1 month ago, she noted the onset of jaundice and dark urine.  She ultimately came to medical attention where she was found to have elevated transaminases, AST much greater than ALT and a bilirubin about 7.  It eventually went up to as high as 13.8 before she was told to completely stop drinking.  She reports that she went through a brief period of alcohol withdrawal, which was managed with Valium successfully.  She also sought out counseling and does meet with a counselor once a week.  She has not drank anything now for more than 1 month.      She denies any abdominal pain.  She does complain of some increased abdominal girth.  She does complain of fairly significant itching and a rash related to the itching.  She does say that she responds fairly well to Benadryl and doxepin in terms of the itching.  She denies any fevers or chills, cough or shortness of breath.  She denies any nausea or vomiting and denies any diarrhea or constipation.  She says her appetite is good and her weight is up a bit.      PAST MEDICAL HISTORY:   She has had previous C-sections.  She has no other medical problems.        MEDICATIONS:  She was put on Lasix, spironolactone, and she is on prednisone as well.        SOCIAL HISTORY:  She is not a smoker.  She has no risk factors for hepatitis C.  She has been worked up for other etiologies of liver disease and none were apparent.      FAMILY HISTORY:  She has a brother who has autoimmune liver disease and is on the wait list for liver transplantation.      REVIEW OF SYSTEMS:  Complete review of systems was  negative other than that noted above.       Current Outpatient Prescriptions   Medication     DiphenhydrAMINE HCl (BENADRYL PO)     predniSONE (DELTASONE) 20 MG tablet     doxepin (SINEQUAN) 10 MG capsule     spironolactone (ALDACTONE) 25 MG tablet     diazepam (VALIUM) 5 MG tablet     furosemide (LASIX) 20 MG tablet     No current facility-administered medications for this visit.      B/P: 110/76, T: 98.4, P: 85, R: Data Unavailable    HEENT exam shows mild scleral icterus.  She has no temporal muscle wasting.  Her neck is without thyromegaly.  Her chest is clear.  Cardiac exam reveals no S3, S4, murmur.  Her abdominal exam shows no obvious ascites.  Her liver is 13-14 cm in span with a very prominent left lobe.  No spleen tip is palpable.  Extremity exam shows no edema.  Skin exam shows some palmar erythema as well as spider angioma and numerous excoriations.  Neurologic exam shows no asterixis.       Recent Results (from the past 168 hour(s))   Comprehensive metabolic panel    Collection Time: 02/13/17  1:22 PM   Result Value Ref Range    Sodium 141 133 - 144 mmol/L    Potassium 4.0 3.4 - 5.3 mmol/L    Chloride 103 94 - 109 mmol/L    Carbon Dioxide 29 20 - 32 mmol/L    Anion Gap 9 3 - 14 mmol/L    Glucose 116 (H) 70 - 99 mg/dL    Urea Nitrogen 6 (L) 7 - 30 mg/dL    Creatinine 0.59 0.52 - 1.04 mg/dL    GFR Estimate >90  Non  GFR Calc   >60 mL/min/1.7m2    GFR Estimate If Black >90   GFR Calc   >60 mL/min/1.7m2    Calcium 9.2 8.5 - 10.1 mg/dL    Bilirubin Total 4.0 (H) 0.2 - 1.3 mg/dL    Albumin 2.4 (L) 3.4 - 5.0 g/dL    Protein Total 7.7 6.8 - 8.8 g/dL    Alkaline Phosphatase 152 (H) 40 - 150 U/L    ALT 42 0 - 50 U/L     (H) 0 - 45 U/L      My impression is that Ms. Garvey has alcoholic liver disease which is markedly improved since December.  I will not be making any change to her medical regimen.  I did discuss at length with her the fact that she really has to completely  abstain from alcohol now for the rest of her life.   She seems to be committed to long-term sobriety and came to the clinic appointment with her .  I will see her back in the clinic in 6 weeks, at which time we will discuss further our long-term plans for management.  It is unclear as to whether she has cirrhosis and at least for the time being, I will treat her as such.  She will get ultrasounds every 6 months.  Will make sure she is up-to-date on her vaccination schedule.  I am gratified that she is in treatment regimen and I have offered her Baclofen to address cravings.       Thank you very much for allowing me to participate in the care of this patient.  If you have any questions regarding my recommendations, please do not hesitate to contact me.       Bienvenido Riley MD     Professor of Medicine  [employer]     Executive Medical Director, Solid Organ Transplant Program  [facility]

## 2017-02-13 NOTE — MR AVS SNAPSHOT
After Visit Summary   2/13/2017    Chanda Garvey    MRN: [id number]           Patient Information     Date Of Birth          1970        Visit Information        Provider Department      2/13/2017 2:15 PM Bienvenido Riley MD [facility] Hepatology         Follow-ups after your visit        Follow-up notes from your care team     Return in about 6 weeks (around 3/27/2017).      Your next 10 appointments already scheduled     Mar 29, 2017  1:00 PM CDT   Lab with  LAB   [facility] ([facility])    [address]  1st Floor  [facility] [id number] [phone]            Mar 29, 2017  2:00 PM CDT   (Arrive by 1:45 PM)   Return General Liver with Bienvenido Riley MD   [facility] Hepatology ([facility])    [address] [place] [address]  3rd Floor  [facility] [address] [phone]              Who to contact     If you have questions or need follow up information about today's clinic visit or your schedule please contact [facility] HEPATOLOG[facility] directly at [phone].  Normal or non-critical lab and imaging results will be communicated to you by iFLYERhart, letter or phone within 4 business days after the clinic has received the results. If you do not hear from us within 7 days, please contact the clinic through [employer] or phone. If you have a critical or abnormal lab result, we will notify you by phone as soon as possible.  Submit refill requests through [employer] or call your pharmacy and they will forward the refill request to us. Please allow 3 business days for your refill to be completed.          Additional Information About Your Visit        iFLYERhart Information     [employer] gives you secure access to your electronic health record. If you see a primary care provider, you can also send messages to your care team and make appointments. If you have questions, please call your primary care clinic.  If you do not have a primary care provider, please  "call [phone] and they will assist you.        Care EveryWhere ID     This is your Care EveryWhere ID. This could be used by other organizations to access your [place] medical records  [id number]        Your Vitals Were     Pulse Temperature Height Last Period Pulse Oximetry BMI (Body Mass Index)    85 98.4  F (36.9  C) (Oral) 1.626 m (5' 4\") 12/23/2016 100% 24.43 kg/m2       Blood Pressure from Last 3 Encounters:   02/13/17 110/76   02/03/17 112/70   01/26/17 120/80    Weight from Last 3 Encounters:   02/13/17 64.5 kg (142 lb 4.8 oz)   02/03/17 65.8 kg (145 lb)   01/26/17 65.3 kg (144 lb)              Today, you had the following     No orders found for display       Primary Care Provider Office Phone # Fax #    Radha Avina [place] [facility] [phone]       [address] [address]        Thank you!     Thank you for choosing [facility] HEPATOLOGY  for your care. Our goal is always to provide you with excellent care. Hearing back from our patients is one way we can continue to improve our services. Please take a few minutes to complete the written survey that you may receive in the mail after your visit with us. Thank you!             Your Updated Medication List - Protect others around you: Learn how to safely use, store and throw away your medicines at www.disposemymeds.org.          This list is accurate as of: 2/13/17  3:07 PM.  Always use your most recent med list.                   Brand Name Dispense Instructions for use    BENADRYL PO      Take 25 mg by mouth every 6 hours as needed       doxepin 10 MG capsule    SINEquan    60 capsule    1 tab by mouth three times daily as needed OR 1-3 tabs by mouth at bedtime       furosemide 20 MG tablet    LASIX    30 tablet    Take 1 tablet (20 mg) by mouth daily       predniSONE 20 MG tablet    DELTASONE    5 tablet    Take 1 tablet (20 mg) by mouth daily for 5 days       spironolactone 25 MG tablet    " ALDACTONE    15 tablet    Take 0.5 tablets (12.5 mg) by mouth daily       VALIUM 5 MG tablet   Generic drug:  diazepam      Take 5 mg by mouth as needed for anxiety or sleep

## 2017-02-13 NOTE — NURSING NOTE
Chief Complaint   Patient presents with     RECHECK     Alcoholic Hepatitis    Pt roomed, vitals, meds, and allergies reviewed with pt. Pt ready for provider.  Yaakov Mccauley, CMA

## 2017-02-14 LAB — HCV AB SERPL QL IA: ABNORMAL

## 2017-02-16 LAB
HCV RNA SERPL NAA+PROBE-ACNC: NORMAL [IU]/ML
HCV RNA SERPL NAA+PROBE-LOG IU: NORMAL LOG IU/ML

## 2017-02-17 DIAGNOSIS — R17 JAUNDICE: Primary | ICD-10-CM

## 2017-02-28 ENCOUNTER — APPOINTMENT (OUTPATIENT)
Dept: GENERAL RADIOLOGY | Facility: CLINIC | Age: 47
End: 2017-02-28
Attending: EMERGENCY MEDICINE
Payer: COMMERCIAL

## 2017-02-28 ENCOUNTER — APPOINTMENT (OUTPATIENT)
Dept: CT IMAGING | Facility: CLINIC | Age: 47
End: 2017-02-28
Attending: EMERGENCY MEDICINE
Payer: COMMERCIAL

## 2017-02-28 ENCOUNTER — HOSPITAL ENCOUNTER (EMERGENCY)
Facility: CLINIC | Age: 47
Discharge: HOME | End: 2017-03-01
Attending: EMERGENCY MEDICINE | Admitting: EMERGENCY MEDICINE
Payer: COMMERCIAL

## 2017-02-28 ENCOUNTER — TELEPHONE (OUTPATIENT)
Dept: FAMILY MEDICINE | Facility: CLINIC | Age: 47
End: 2017-02-28

## 2017-02-28 DIAGNOSIS — R10.11 RUQ ABDOMINAL PAIN: ICD-10-CM

## 2017-02-28 DIAGNOSIS — K70.9 ALCOHOL LIVER DAMAGE (H): ICD-10-CM

## 2017-02-28 LAB
ALBUMIN SERPL-MCNC: 3.1 G/DL (ref 3.4–5)
ALP SERPL-CCNC: 90 U/L (ref 40–150)
ALT SERPL W P-5'-P-CCNC: 32 U/L (ref 0–50)
ANION GAP SERPL CALCULATED.3IONS-SCNC: 8 MMOL/L (ref 3–14)
AST SERPL W P-5'-P-CCNC: 51 U/L (ref 0–45)
BASOPHILS # BLD AUTO: 0 10E9/L (ref 0–0.2)
BASOPHILS NFR BLD AUTO: 0.3 %
BILIRUB SERPL-MCNC: 1.9 MG/DL (ref 0.2–1.3)
BUN SERPL-MCNC: 7 MG/DL (ref 7–30)
CALCIUM SERPL-MCNC: 9.2 MG/DL (ref 8.5–10.1)
CHLORIDE SERPL-SCNC: 108 MMOL/L (ref 94–109)
CO2 SERPL-SCNC: 25 MMOL/L (ref 20–32)
CREAT SERPL-MCNC: 0.55 MG/DL (ref 0.52–1.04)
D DIMER PPP FEU-MCNC: 3.4 UG/ML FEU (ref 0–0.5)
DIFFERENTIAL METHOD BLD: ABNORMAL
EOSINOPHIL # BLD AUTO: 0.2 10E9/L (ref 0–0.7)
EOSINOPHIL NFR BLD AUTO: 2.1 %
ERYTHROCYTE [DISTWIDTH] IN BLOOD BY AUTOMATED COUNT: 12.4 % (ref 10–15)
GFR SERPL CREATININE-BSD FRML MDRD: ABNORMAL ML/MIN/1.7M2
GLUCOSE SERPL-MCNC: 87 MG/DL (ref 70–99)
HCT VFR BLD AUTO: 38.5 % (ref 35–47)
HGB BLD-MCNC: 13.1 G/DL (ref 11.7–15.7)
IMM GRANULOCYTES # BLD: 0 10E9/L (ref 0–0.4)
IMM GRANULOCYTES NFR BLD: 0.1 %
LACTATE BLD-SCNC: 1.7 MMOL/L (ref 0.7–2.1)
LIPASE SERPL-CCNC: 75 U/L (ref 73–393)
LYMPHOCYTES # BLD AUTO: 1.7 10E9/L (ref 0.8–5.3)
LYMPHOCYTES NFR BLD AUTO: 24.6 %
MCH RBC QN AUTO: 37.9 PG (ref 26.5–33)
MCHC RBC AUTO-ENTMCNC: 34 G/DL (ref 31.5–36.5)
MCV RBC AUTO: 111 FL (ref 78–100)
MONOCYTES # BLD AUTO: 0.4 10E9/L (ref 0–1.3)
MONOCYTES NFR BLD AUTO: 5.8 %
NEUTROPHILS # BLD AUTO: 4.8 10E9/L (ref 1.6–8.3)
NEUTROPHILS NFR BLD AUTO: 67.1 %
NRBC # BLD AUTO: 0 10*3/UL
NRBC BLD AUTO-RTO: 0 /100
PLATELET # BLD AUTO: 212 10E9/L (ref 150–450)
POTASSIUM SERPL-SCNC: 3.4 MMOL/L (ref 3.4–5.3)
PROT SERPL-MCNC: 8.2 G/DL (ref 6.8–8.8)
RBC # BLD AUTO: 3.46 10E12/L (ref 3.8–5.2)
SODIUM SERPL-SCNC: 141 MMOL/L (ref 133–144)
WBC # BLD AUTO: 7.1 10E9/L (ref 4–11)

## 2017-02-28 PROCEDURE — 71260 CT THORAX DX C+: CPT

## 2017-02-28 PROCEDURE — 83605 ASSAY OF LACTIC ACID: CPT | Performed by: EMERGENCY MEDICINE

## 2017-02-28 PROCEDURE — 83690 ASSAY OF LIPASE: CPT | Performed by: EMERGENCY MEDICINE

## 2017-02-28 PROCEDURE — 99285 EMERGENCY DEPT VISIT HI MDM: CPT | Mod: 25 | Performed by: EMERGENCY MEDICINE

## 2017-02-28 PROCEDURE — 71020 XR CHEST 2 VW: CPT

## 2017-02-28 PROCEDURE — 99284 EMERGENCY DEPT VISIT MOD MDM: CPT | Mod: Z6 | Performed by: EMERGENCY MEDICINE

## 2017-02-28 PROCEDURE — 25500064 ZZH RX 255 OP 636: Performed by: RADIOLOGY

## 2017-02-28 PROCEDURE — 85379 FIBRIN DEGRADATION QUANT: CPT | Performed by: EMERGENCY MEDICINE

## 2017-02-28 PROCEDURE — 80053 COMPREHEN METABOLIC PANEL: CPT | Performed by: EMERGENCY MEDICINE

## 2017-02-28 PROCEDURE — 85025 COMPLETE CBC W/AUTO DIFF WBC: CPT | Performed by: EMERGENCY MEDICINE

## 2017-02-28 RX ORDER — IOPAMIDOL 755 MG/ML
54 INJECTION, SOLUTION INTRAVASCULAR ONCE
Status: COMPLETED | OUTPATIENT
Start: 2017-02-28 | End: 2017-02-28

## 2017-02-28 RX ADMIN — IOPAMIDOL 54 ML: 755 INJECTION, SOLUTION INTRAVENOUS at 22:40

## 2017-02-28 ASSESSMENT — ENCOUNTER SYMPTOMS
COUGH: 0
BACK PAIN: 0
FEVER: 0
SHORTNESS OF BREATH: 0
SLEEP DISTURBANCE: 1
DYSURIA: 0
ABDOMINAL PAIN: 1

## 2017-02-28 NOTE — TELEPHONE ENCOUNTER
"S-(situation): Pain is upper abd on the right side but moves around. Has \"huge major amount of pain \" starting day before yesterday. Can't sleep due to pain. First time she has had this pain. Rates as 7 on 1-10 scale. Pain is constant. No fever, nausea or diarrhea. Looks swollen to her on the right side more than the left side. If she pushes on it it feels worse. Sometimes pain on back side and at times at front. \"I feel like it moves around\". Last BM was yesterday and was normal. Just got menses yesterday but this doesn't feel like menstrual cramps. Urine is normal color. Can walk about normally but hurts to stand up, when takes a deep breath it hurts. Doing shallow breathing due to pain     B-(background): Inflammed liver from drinking, has a heptalogist and last seen in mid Feb. Was told liver would reduce in size . It has been 5 weeks since last drink. No recent injury . Did not go trhough treatment , just stopped.     A-(assessment): needs immediate assessment    R-(recommendations): to ED now. She may call Dr Riley but unless he advises otherwise she is advised to go to ED   \"OK\"    Nina Fontana, RN, BSN         "

## 2017-02-28 NOTE — ED AVS SNAPSHOT
[facility], [place], Emergency Department    [address] [place] [address]    Phone:  [phone]                                       Chanda Garvey   MRN: [id number]    Department:  [facility], Emergency Department   Date of Visit:  2/28/2017           After Visit Summary Signature Page     I have received my discharge instructions, and my questions have been answered. I have discussed any challenges I see with this plan with the nurse or doctor.    ..........................................................................................................................................  Patient/Patient Representative Signature      ..........................................................................................................................................  Patient Representative Print Name and Relationship to Patient    ..................................................               ................................................  Date                                            Time    ..........................................................................................................................................  Reviewed by Signature/Title    ...................................................              ..............................................  Date                                                            Time

## 2017-02-28 NOTE — ED NOTES
Pt reports RUQ abd pain severe constant since Sunday pm, increases w/ activity, difficult to sleep. + nausea. Denies vomiting, fever or injury. Pain increases w/ deep inspriation, coughing.

## 2017-02-28 NOTE — ED AVS SNAPSHOT
[facility], Emergency Department    [address] [facility] [address]    Phone:  [phone]                                       Chanda Garvey   MRN: [id number]    Department:  [facility], Emergency Department   Date of Visit:  2/28/2017           Patient Information     Date Of Birth          1970        Your diagnoses for this visit were:     RUQ abdominal pain        You were seen by Peewee Valenzuela MD.        Discharge Instructions       Please call to discuss appropriate follow-up with Dr. Riley tomorrow morning.  The results of all our laboratory tests and imaging will be available.  The cause of your abdominal pain is uncertain. It may be related to the abdominal fluid collection we see under your diaphragm    Return to the emergency department for any worsening back pain, abdominal pain, fevers or chills, burning with urination, nausea or vomiting, weakness or any other concerns as given or discussed.      Future Appointments        Provider Department Dept Phone Center    3/29/2017 1:00 PM Lab [facility] Lab [phone] [facility]    3/29/2017 2:00 PM Bienvenido Riley MD [facility] Hepatology [phone] [facility]      24 Hour Appointment Hotline       To make an appointment at any [facility], call [phone] ([phone]). If you don't have a family doctor or clinic, we will help you find one. [facility] clinics are conveniently located to serve the needs of you and your family.             Review of your medicines      START taking        Dose / Directions Last dose taken    oxyCODONE 5 MG IR tablet   Commonly known as:  ROXICODONE   Dose:  5 mg   Quantity:  7 tablet        Take 1 tablet (5 mg) by mouth nightly as needed for pain   Refills:  0          Our records show that you are taking the medicines listed below. If these are incorrect, please call your family doctor or clinic.        Dose / Directions Last dose taken    BENADRYL PO   Dose:  25 mg        Take 25 mg by mouth  every 6 hours as needed   Refills:  0        doxepin 10 MG capsule   Commonly known as:  SINEquan   Quantity:  60 capsule        1 tab by mouth three times daily as needed OR 1-3 tabs by mouth at bedtime   Refills:  0        furosemide 20 MG tablet   Commonly known as:  LASIX   Dose:  20 mg   Quantity:  30 tablet        Take 1 tablet (20 mg) by mouth daily   Refills:  0        spironolactone 25 MG tablet   Commonly known as:  ALDACTONE   Dose:  12.5 mg   Quantity:  15 tablet        Take 0.5 tablets (12.5 mg) by mouth daily   Refills:  0        VALIUM 5 MG tablet   Dose:  5 mg   Generic drug:  diazepam        Take 5 mg by mouth as needed for anxiety or sleep   Refills:  0                Prescriptions were sent or printed at these locations (1 Prescription)                   Other Prescriptions                Printed at Department/Unit printer (1 of 1)         oxyCODONE (ROXICODONE) 5 MG IR tablet                Procedures and tests performed during your visit     CBC with platelets differential    CT Abdomen Pelvis w Contrast    CT Chest Pulmonary Embolism w Contrast    Comprehensive metabolic panel    D dimer quantitative    Lactic acid whole blood    Lipase    POC US ABDOMEN LIMITED    XR Chest 2 Views      Orders Needing Specimen Collection     None      Pending Results     Date and Time Order Name Status Description    3/1/2017 0000 CT Abdomen Pelvis w Contrast In process     2/28/2017 2229 CT Chest Pulmonary Embolism w Contrast Preliminary     2/28/2017 2114 XR Chest 2 Views Preliminary     2/28/2017 2113 POC US ABDOMEN LIMITED In process             Pending Culture Results     No orders found for last 3 day(s).            Thank you for choosing [facility]       Thank you for choosing [facility] for your care. Our goal is always to provide you with excellent care. Hearing back from our patients is one way we can continue to improve our services. Please take a few minutes to complete the written survey that you may  receive in the mail after you visit with us. Thank you!        [employer] Information     [employer] gives you secure access to your electronic health record. If you see a primary care provider, you can also send messages to your care team and make appointments. If you have questions, please call your primary care clinic.  If you do not have a primary care provider, please call [phone] and they will assist you.        Care EveryWhere ID     This is your Care EveryWhere ID. This could be used by other organizations to access your [place] medical records  [id number]        After Visit Summary       This is your record. Keep this with you and show to your community pharmacist(s) and doctor(s) at your next visit.

## 2017-03-01 ENCOUNTER — APPOINTMENT (OUTPATIENT)
Dept: CT IMAGING | Facility: CLINIC | Age: 47
End: 2017-03-01
Attending: EMERGENCY MEDICINE
Payer: COMMERCIAL

## 2017-03-01 VITALS
RESPIRATION RATE: 16 BRPM | HEIGHT: 64 IN | OXYGEN SATURATION: 98 % | DIASTOLIC BLOOD PRESSURE: 78 MMHG | BODY MASS INDEX: 23.9 KG/M2 | HEART RATE: 82 BPM | TEMPERATURE: 97.9 F | SYSTOLIC BLOOD PRESSURE: 117 MMHG | WEIGHT: 140 LBS

## 2017-03-01 PROCEDURE — 74177 CT ABD & PELVIS W/CONTRAST: CPT

## 2017-03-01 PROCEDURE — 25000125 ZZHC RX 250: Performed by: EMERGENCY MEDICINE

## 2017-03-01 PROCEDURE — 25500064 ZZH RX 255 OP 636: Performed by: EMERGENCY MEDICINE

## 2017-03-01 RX ORDER — IOPAMIDOL 755 MG/ML
87 INJECTION, SOLUTION INTRAVASCULAR ONCE
Status: COMPLETED | OUTPATIENT
Start: 2017-03-01 | End: 2017-03-01

## 2017-03-01 RX ORDER — OXYCODONE HYDROCHLORIDE 5 MG/1
5 TABLET ORAL
Qty: 7 TABLET | Refills: 0 | Status: SHIPPED | OUTPATIENT
Start: 2017-03-01 | End: 2017-03-29

## 2017-03-01 RX ADMIN — SODIUM CHLORIDE, PRESERVATIVE FREE 76 ML: 5 INJECTION INTRAVENOUS at 00:49

## 2017-03-01 RX ADMIN — IOPAMIDOL 87 ML: 755 INJECTION, SOLUTION INTRAVENOUS at 00:45

## 2017-03-01 NOTE — DISCHARGE INSTRUCTIONS
Please call to discuss appropriate follow-up with Dr. Riley tomorrow morning.  The results of all our laboratory tests and imaging will be available.  The cause of your abdominal pain is uncertain. It may be related to the abdominal fluid collection we see under your diaphragm    Return to the emergency department for any worsening back pain, abdominal pain, fevers or chills, burning with urination, nausea or vomiting, weakness or any other concerns as given or discussed.

## 2017-03-01 NOTE — ED PROVIDER NOTES
"  History     Chief Complaint   Patient presents with     Abdominal Pain     Pt reports RUQ abd pain severe constant since Sunday pm, increases w/ activity, difficult to sleep. + nausea. Denies vomiting, fever or injury. Pain increases w/ deep inspriation, coughing.     HPI  Chanda Garvey is a 46 year old female with PMH of recent alcoholic hepatitis presenting with abdominal pain.    Pt developed right-sided abdominal pain on Sunday at 6 PM, not doing anything in particular at the time. Sudden onset, constant since.  Pain localized to RUQ, though whole right side abdomen hurts with cough or deep breath.  Pain is provoked by deep breath, cough, lying flat, and sitting up. Difficulty sleeping d/t pain.  No fevers. No chest pain, no other pain.  Pleuritic pain, though no SOB or CARBALLO.   Pt has noticed ongoing mild bilateral feet swelling. This might be mildly worse recently, though notes she has \"bloating\" on menstrual period, which she started yesterday.  No abnormal vaginal discharge or dysuria.    Pt w/ hx of alcoholic hepatitis 6 wks ago. Has not had a drink in 5 wks, and notes improving LFTs and jaundice.  Denies other recreational drug use.  No recent falls.  Pt has all abdominal viscera. No hx of GB disease.    I have reviewed the Medications, Allergies, Past Medical and Surgical History, and Social History in the Epic system.    No current facility-administered medications for this encounter.      Current Outpatient Prescriptions   Medication     oxyCODONE (ROXICODONE) 5 MG IR tablet     DiphenhydrAMINE HCl (BENADRYL PO)     doxepin (SINEQUAN) 10 MG capsule     spironolactone (ALDACTONE) 25 MG tablet     furosemide (LASIX) 20 MG tablet     diazepam (VALIUM) 5 MG tablet     Past Medical History   Diagnosis Date     ASCUS of cervix with negative high risk HPV 11/3/2004     resolved     NO ACTIVE PROBLEMS        Past Surgical History   Procedure Laterality Date     C nonspecific procedure  4/19/03     " "       Family History   Problem Relation Age of Onset     DIABETES Brother      adult onset, on insulin     OSTEOPOROSIS Mother      Blood Disease Mother      Lupus     Coronary Artery Disease Mother 72     MI       Social History   Substance Use Topics     Smoking status: Former Smoker     Packs/day: 1.00     Years: 10.00     Types: Cigarettes     Quit date: 2001     Smokeless tobacco: Not on file     Alcohol use Yes      Comment: 5 drinks per day, sober since 2017        Allergies   Allergen Reactions     Penicillins      Sulfa Drugs       Review of Systems   Constitutional: Negative for fever.   Respiratory: Negative for cough and shortness of breath.    Cardiovascular: Positive for leg swelling (mild pedal swelling ongoing). Negative for chest pain.   Gastrointestinal: Positive for abdominal pain (RUQ).   Genitourinary: Negative for dysuria and vaginal discharge.   Musculoskeletal: Negative for back pain.   Psychiatric/Behavioral: Positive for sleep disturbance (d/t pain).   All other systems reviewed and are negative.      Physical Exam   BP: 119/74  Pulse: 82  Temp: 97.9  F (36.6  C)  Resp: 16  Height: 162.6 cm (5' 4\")  Weight: 63.5 kg (140 lb)  SpO2: 99 %  Physical Exam   HEENT: The head is normocephalic and atraumatic. Pupils are equal round and reactive to light. Extraocular motions are intact. There is no scleral icterus. There is no facial swelling. The neck nontender and supple .   CV: Regular rate and rhythm without murmurs rubs or gallops. 2+ radial pulses bilaterally.  PULM: Clear to auscultation bilaterally.  ABD: No CVA tenderness. There is some tenderness to palpation in the suprapubic abdomen as well as the right anterior costal margin, though no tenderness to palpation along the ribs themselves. Severe pleurisy in the described area.  EXT: Full range of motion.  No edema.  NEURO: Cranial nerves II through XII are intact and symmetric. Bilateral upper and lower extremities " grossly show full range of motion without any focal deficits.  SKIN: No rashes, ecchymosis, or lacerations  PSYCH: Calm and cooperative, interactive.     ED Course     ED Course     9:21 PM  The patient was seen and examined by Peewee Valenzuela MD, in Room 18.     Procedures       Results for orders placed or performed during the hospital encounter of 02/28/17   XR Chest 2 Views    Narrative    EXAM: XR CHEST 2 VW  2/28/2017 9:31 PM      HISTORY: pleuritic R base chest pain    COMPARISON: None available    FINDINGS: Cardiac silhouette and pulmonary vasculature are within  normal limits. Trace left sided pleural effusion. Streaky bibasilar  opacities, left greater than right. No pneumothorax.  No  No acute  bony abnormalities. Visualized upper abdomen is unremarkable.          Impression    IMPRESSION: Trace left sided pleural effusion with associated  bibasilar atelectasis, aspiration or infection. Minimal right basilar  atelectasis.    I have personally reviewed the examination and initial interpretation  and I agree with the findings.    ALEX SANTANA MD   CT Chest Pulmonary Embolism w Contrast    Narrative    Examination:  CT CHEST PULMONARY EMBOLISM W CONTRAST 2/28/2017 10:54  PM     Comparison: Same day chest x-ray.    History: eval for PE, R chest base    TECHNIQUE: Volumetric helical acquisition of CT images of the chest  from the lung apices to the kidneys were acquired in arterial phase  after the administration of IV contrast.     Contrast dose: 54 cc of isovue 370    FINDINGS:  There is adequate opacification of the main and lobar pulmonary  arteries. No filling defect in the lobar and main segmental pulmonary  arteries to suggest pulmonary embolism.  There is no right-sided heart  strain. The heart is normal in size without significant pericardial  effusion. Proximal great vessels are normal in caliber. No  lymphadenopathy in the chest or axilla. Visualized thyroid gland and  esophagus are unremarkable.      Central tracheal bronchial tree is clear. Bibasilar atelectasis and/or  scarring. No focal airspace opacity or significant interstitial  thickening. No pleural effusion or pneumothorax.    Upper Abdomen: Partially visualized perihepatic fluid along the  anterior right hepatic margin possibly extending into the gallbladder  fossa. Possible focal capsular retraction involving the anterior right  liver lobe. Partially visualized spleen is somewhat bulky in  appearance. Otherwise visualized upper abdomen is unremarkable on this  limited unenhanced exam.     Bones and soft tissues: No pathologic appearing bony lesions.      Impression    IMPRESSION:   1. No evidence of PE. No focal opacities to suggest infection to.  2. Partially visualized perihepatic fluid along the anterior right  hepatic margin, most likely represents ascites given ascites was also  present on 2/3/2017 ultrasound. Possible mild focal capsular  retraction, may represent confluent hepatic fibrosis, consider further  characterization with liver mass protocol MRI or CT.    I have personally reviewed the examination and initial interpretation  and I agree with the findings.    ALEX SANTANA MD   CT Abdomen Pelvis w Contrast    Narrative    EXAMINATION: CT ABDOMEN PELVIS W CONTRAST, 3/1/2017 12:54 AM    TECHNIQUE:  Helical CT images from the lung bases through the  symphysis pubis were obtained  with IV contrast. Contrast dose: 87 ml  isovue 370     COMPARISON: CT chest same day, ultrasound 2/3/2017    HISTORY: RUQ abdominal pain    FINDINGS:    Abdomen and pelvis: Gallbladder is redundant, extending anteriorly and  superiorly along the anterior right liver margin with associated  indentation of the hepatic surface, hepatic segment 8. No gallstones  or bile bladder wall thickening. No intra or extra hepatic biliary  dilatation.  Liver is enlarged with nodular surface contour. Somewhat  heterogeneous enhancement of the liver parenchyma, particularly  the  right liver lobe.  Well-circumscribed subcentimeter hypodensity at the  hepatic dome at the junction of hepatic segment 4A and 8 (image 13  series 2), too small to characterize and likely represents a cyst.  Trace perihepatic free fluid along the anterior hepatic segment 8 and  along the gallbladder also seen on 2/3/2017 ultrasound.    Ectopic, malrotated, low laying right kidney. Spleen, adrenal glands  and left kidney are unremarkable. No dilated bowel loops to suggest  obstruction. Appendix is air-filled, folded along the inferior liver  margin. Minimal fat stranding along the inferior liver margin, in  close proximity to the base of the appendix, and adjacent to the left  gonadal vein. No focal bladder wall thickening. Tampon within the  vagina.  Uterus and adnexa are unremarkable. No free air in the  abdomen or pelvis. Major abdominal and pelvic vessels are patent.    Lung bases:  Bibasilar atelectasis.    Bones and soft tissues: No pathologic appearing bony lesions.  Periumbilical abdominal wall eventration.      Impression    IMPRESSION:   1. Redundant gallbladder extending along the anterior right liver  margin with associated indentation of the hepatic surface, likely  congenital in nature.   2. 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  3. Trace perihepatic fluid, unchanged since 2/3/2017 and of doubtful  clinical significance.  4. Minimal fat stranding along the inferior liver margin, which is  nonspecific and may represent hepatic inflammation. Stranding extends  towards the base of the appendix. Appendix is otherwise unremarkable.    I have personally reviewed the examination and initial interpretation  and I agree with the findings.    XAVIER GALEANO MD            Labs Ordered and Resulted from Time of ED Arrival Up to the Time of Departure from the ED   CBC WITH PLATELETS DIFFERENTIAL - Abnormal;  Notable for the following:        Result Value    RBC Count 3.46 (*)      (*)     MCH 37.9 (*)     All other components within normal limits   COMPREHENSIVE METABOLIC PANEL - Abnormal; Notable for the following:     Bilirubin Total 1.9 (*)     Albumin 3.1 (*)     AST 51 (*)     All other components within normal limits   D DIMER QUANTITATIVE - Abnormal; Notable for the following:     D Dimer 3.4 (*)     All other components within normal limits   LIPASE   LACTIC ACID WHOLE BLOOD       Assessments & Plan (with Medical Decision Making)     45yo F with hx of alcoholic liver disease, sober for 5 wks, presenting with acute onset of right anterior upper abdominal/lower chest focal tenderness to palpation with pleurisy as described above.  She has recently had a normal biliary ultrasound earlier this month showing a nl gallbladder with no focal wall thickening and absence of stones.  Outside from the pain, the pt has experienced no fevers or chills, no other abdominal pain. She has actually been quite well and had a recent notable visit with Dr. Riley several weeks ago.  Ddx includes PNA, diaphragmatic irritation from a variety of causes and infection, pulmonary embolism, pneumothorax, hepatitis, portal vein or IVC thrombus-much less likely, pancreatitis, MSK etiologies, among many other causes.  The pt s labs reviewed and are unremarkable.   exc for elevate d-dimer  CXR shows trace left but no right pleural effusion.    Given clinical concern, CT PE protocol performed which showed no evidence of pulmonary embolism, however fluid was noted along the right diaphragm above the liver.  Subsequent CT abdomen and pelvis showed persistent trace perihepatic fluid, unchanged and likely not relating to patient's pain today. There is also minimal fat stranding representative of likely hepatic inflammation, but doubtful represents an acute abnormality.    Overall the patient has been clinically stable in the emergency  department. A variety of emergent significant lower chest and abdominal consideration to be excluded as noted above.  She does have a follow-up with Dr. Riley for reevaluation. At this point an etiology of her pain is uncertain, but clinically diaphragmatic irritation is most likely. She'll be discharged and will return for any worsening signs or symptoms    - Patient ready and eager for discharge. Care plan, follow up plan, and reasons to return immediately to the ED were dicussed with the patient and summarized as noted in the discharge instructions.      This part of the document was transcribed by Naeem Rosenthal for Peewee Valenzuela MD.    I have reviewed the nursing notes.    I have reviewed the findings, diagnosis, plan and need for follow up with the patient.    Discharge Medication List as of 3/1/2017  1:55 AM      START taking these medications    Details   oxyCODONE (ROXICODONE) 5 MG IR tablet Take 1 tablet (5 mg) by mouth nightly as needed for pain, Disp-7 tablet, R-0, Local Print             Final diagnoses:   RUQ abdominal pain     INaeem, am serving as a trained medical scribe to document services personally performed by Peewee Valenzuela MD, based on the provider's statements to me.      Peewee BUENO MD, was physically present and have reviewed and verified the accuracy of this note documented by Naeem Rosenthal.    2/28/2017   [facility], [place], EMERGENCY DEPARTMENT     Peewee Valenzuela MD  03/19/17 [id number]

## 2017-03-02 ENCOUNTER — TELEPHONE (OUTPATIENT)
Dept: GASTROENTEROLOGY | Facility: CLINIC | Age: 47
End: 2017-03-02

## 2017-03-02 NOTE — TELEPHONE ENCOUNTER
----- Message from Bienvenido Riley MD sent at 3/2/2017  4:05 PM CST -----  Regarding: RE: Dr Riley - ED f/u per pt req. a call back  Contact: [phone]  I spoke with her yesterday.    ----- Message -----     From: Linette Mo LPN     Sent: 3/2/2017   1:00 PM       To: Bienvenido Riley MD  Subject: FW: Dr Riley - ED f/u per pt req. a call back     Did you call pt? do I need to do anything with this.  ----- Message -----     From: Sharron Gomez LPN     Sent: 3/1/2017  10:38 AM       To: Linette Mo LPN  Subject: FW: Dr Riley - ED f/u per pt req. a call back         ----- Message -----     From: Jonathon Marroquin     Sent: 3/1/2017  10:26 AM       To: Hepatology Nurses-  Subject: Dr Riley - ED f/u per pt req. a call back         [facility] and has been experiencing some abdominal pain. The ED Dr wasn't sure where it was coming from and they did a CT scan, he suggested that pt call Dr Riley and talk to him about the CT. Pt wasn't sure if she needed to come in to see Dr Riley or if he can just call her since she is aware he is busy. I called triage, triage was hesitant to approve any appts for Dr Riley and requested that I send an inbasket. Pt can be reached at [phone] thanks  [facility] - Adult Call center  Please DO NOT send this message and/or reply back to sender.  Call Center Representatives DO NOT respond to messages.

## 2017-03-21 ENCOUNTER — RADIANT APPOINTMENT (OUTPATIENT)
Dept: MAMMOGRAPHY | Facility: CLINIC | Age: 47
End: 2017-03-21

## 2017-03-21 DIAGNOSIS — Z00.00 PREVENTATIVE HEALTH CARE: ICD-10-CM

## 2017-03-29 ENCOUNTER — OFFICE VISIT (OUTPATIENT)
Dept: GASTROENTEROLOGY | Facility: CLINIC | Age: 47
End: 2017-03-29
Attending: INTERNAL MEDICINE
Payer: COMMERCIAL

## 2017-03-29 VITALS
HEIGHT: 64 IN | OXYGEN SATURATION: 100 % | BODY MASS INDEX: 23.8 KG/M2 | DIASTOLIC BLOOD PRESSURE: 81 MMHG | SYSTOLIC BLOOD PRESSURE: 122 MMHG | HEART RATE: 75 BPM | RESPIRATION RATE: 16 BRPM | TEMPERATURE: 98.5 F | WEIGHT: 139.4 LBS

## 2017-03-29 DIAGNOSIS — R17 JAUNDICE: ICD-10-CM

## 2017-03-29 DIAGNOSIS — K70.30 ALCOHOLIC CIRRHOSIS OF LIVER WITHOUT ASCITES (H): Primary | ICD-10-CM

## 2017-03-29 LAB
ALBUMIN SERPL-MCNC: 3.6 G/DL (ref 3.4–5)
ALP SERPL-CCNC: 79 U/L (ref 40–150)
ALT SERPL W P-5'-P-CCNC: 18 U/L (ref 0–50)
ANION GAP SERPL CALCULATED.3IONS-SCNC: 10 MMOL/L (ref 3–14)
AST SERPL W P-5'-P-CCNC: 23 U/L (ref 0–45)
BILIRUB DIRECT SERPL-MCNC: 0.5 MG/DL (ref 0–0.2)
BILIRUB SERPL-MCNC: 1 MG/DL (ref 0.2–1.3)
BUN SERPL-MCNC: 3 MG/DL (ref 7–30)
CALCIUM SERPL-MCNC: 9.6 MG/DL (ref 8.5–10.1)
CHLORIDE SERPL-SCNC: 102 MMOL/L (ref 94–109)
CO2 SERPL-SCNC: 26 MMOL/L (ref 20–32)
CREAT SERPL-MCNC: 0.56 MG/DL (ref 0.52–1.04)
ERYTHROCYTE [DISTWIDTH] IN BLOOD BY AUTOMATED COUNT: 12.3 % (ref 10–15)
GFR SERPL CREATININE-BSD FRML MDRD: ABNORMAL ML/MIN/1.7M2
GLUCOSE SERPL-MCNC: 113 MG/DL (ref 70–99)
HCT VFR BLD AUTO: 37.7 % (ref 35–47)
HGB BLD-MCNC: 12.5 G/DL (ref 11.7–15.7)
INR PPP: 1.28 (ref 0.86–1.14)
MCH RBC QN AUTO: 33.9 PG (ref 26.5–33)
MCHC RBC AUTO-ENTMCNC: 33.2 G/DL (ref 31.5–36.5)
MCV RBC AUTO: 102 FL (ref 78–100)
PLATELET # BLD AUTO: 213 10E9/L (ref 150–450)
POTASSIUM SERPL-SCNC: 3.9 MMOL/L (ref 3.4–5.3)
PROT SERPL-MCNC: 7.9 G/DL (ref 6.8–8.8)
RBC # BLD AUTO: 3.69 10E12/L (ref 3.8–5.2)
SODIUM SERPL-SCNC: 138 MMOL/L (ref 133–144)
WBC # BLD AUTO: 7.1 10E9/L (ref 4–11)

## 2017-03-29 PROCEDURE — 99212 OFFICE O/P EST SF 10 MIN: CPT | Mod: ZF

## 2017-03-29 PROCEDURE — 80048 BASIC METABOLIC PNL TOTAL CA: CPT | Performed by: INTERNAL MEDICINE

## 2017-03-29 PROCEDURE — 80076 HEPATIC FUNCTION PANEL: CPT | Performed by: INTERNAL MEDICINE

## 2017-03-29 PROCEDURE — 36415 COLL VENOUS BLD VENIPUNCTURE: CPT | Performed by: INTERNAL MEDICINE

## 2017-03-29 PROCEDURE — 85610 PROTHROMBIN TIME: CPT | Performed by: INTERNAL MEDICINE

## 2017-03-29 PROCEDURE — 85027 COMPLETE CBC AUTOMATED: CPT | Performed by: INTERNAL MEDICINE

## 2017-03-29 ASSESSMENT — PAIN SCALES - GENERAL: PAINLEVEL: NO PAIN (0)

## 2017-03-29 NOTE — PROGRESS NOTES
I had the pleasure of seeing Chanda Garvey for followup in the Liver Clinic at the [facility] on 03/29/2017.  Ms. Garvey returns for followup of alcoholic cirrhosis.      She continues to abstain from alcohol and is doing really quite well.  She denies any abdominal pain, itching or skin rash and also has improving fatigue.  She is more active and does complain of a number of aches and pains, particularly joint aches.  She also feels that her fingers are relatively swollen as well.        She denies any fevers or chills, cough or shortness of breath.  She denies any nausea, vomiting, diarrhea or constipation.  Her appetite has been good, and her weight has remained relatively stable, although she would like to lose a few more pounds.  She has had a few minor cravings for alcohol but feels as though she does not need any medication to help with that problem.       Current Outpatient Prescriptions   Medication     DiphenhydrAMINE HCl (BENADRYL PO)     doxepin (SINEQUAN) 10 MG capsule     diazepam (VALIUM) 5 MG tablet     No current facility-administered medications for this visit.      B/P: 122/81, T: 98.5, P: 75, R: 16    HEENT exam shows no scleral icterus and no temporal muscle wasting.  Her chest is clear.  Her abdominal exam shows no increase in girth.  No masses or tenderness to palpation are present.  Liver is 10 cm in span without left lobe enlargement.  No spleen tip is palpable, and extremity exam shows no edema.  Skin exam does show some palmar erythema, as well as some spider angioma, and neurologic exam shows no asterixis.       Recent Results (from the past 168 hour(s))   Hepatic panel    Collection Time: 03/29/17  1:17 PM   Result Value Ref Range    Bilirubin Direct 0.5 (H) 0.0 - 0.2 mg/dL    Bilirubin Total 1.0 0.2 - 1.3 mg/dL    Albumin 3.6 3.4 - 5.0 g/dL    Protein Total 7.9 6.8 - 8.8 g/dL    Alkaline Phosphatase 79 40 - 150 U/L    ALT 18 0 - 50 U/L    AST 23 0 - 45 U/L    CBC with platelets    Collection Time: 03/29/17  1:17 PM   Result Value Ref Range    WBC 7.1 4.0 - 11.0 10e9/L    RBC Count 3.69 (L) 3.8 - 5.2 10e12/L    Hemoglobin 12.5 11.7 - 15.7 g/dL    Hematocrit 37.7 35.0 - 47.0 %     (H) 78 - 100 fl    MCH 33.9 (H) 26.5 - 33.0 pg    MCHC 33.2 31.5 - 36.5 g/dL    RDW 12.3 10.0 - 15.0 %    Platelet Count 213 150 - 450 10e9/L   INR    Collection Time: 03/29/17  1:17 PM   Result Value Ref Range    INR 1.28 (H) 0.86 - 1.14   Basic metabolic panel    Collection Time: 03/29/17  1:17 PM   Result Value Ref Range    Sodium 138 133 - 144 mmol/L    Potassium 3.9 3.4 - 5.3 mmol/L    Chloride 102 94 - 109 mmol/L    Carbon Dioxide 26 20 - 32 mmol/L    Anion Gap 10 3 - 14 mmol/L    Glucose 113 (H) 70 - 99 mg/dL    Urea Nitrogen 3 (L) 7 - 30 mg/dL    Creatinine 0.56 0.52 - 1.04 mg/dL    GFR Estimate >90  Non  GFR Calc   >60 mL/min/1.7m2    GFR Estimate If Black >90   GFR Calc   >60 mL/min/1.7m2    Calcium 9.6 8.5 - 10.1 mg/dL      My impression is that Ms. Garvey has alcoholic liver disease and may have alcoholic cirrhosis.  Her liver tests have now completely normalized which is obviously quite encouraging.  I have congratulated her on her abstinence and have offered help if she needs assistance with cravings.  I have encouraged her to be more active, and I think the aches and pains are probably just a result of that.  All complications are currently well addressed, and my plan will be to see her back in the clinic again in 3 months.      Thank you very much for allowing me to participate in the care of this patient.  If you have any questions regarding my recommendations, please do not hesitate to contact me.       Bienvenido Riley MD      Professor of Medicine  [employer]      Executive Medical Director, Solid Organ Transplant Program  [facility]

## 2017-03-29 NOTE — MR AVS SNAPSHOT
After Visit Summary   3/29/2017    Chanda Garvey    MRN: [id number]           Patient Information     Date Of Birth          1970        Visit Information        Provider Department      3/29/2017 2:00 PM Bienvenido Riley MD [facility] Hepatology        Today's Diagnoses     Alcoholic cirrhosis of liver without ascites (H)    -  1       Follow-ups after your visit        Follow-up notes from your care team     Return in about 6 months (around 9/29/2017).      Your next 10 appointments already scheduled     Sep 26, 2017  8:45 AM CDT   Lab with  LAB   [facility] Lab ([facility])    [address] [place] [id number] [phone]            Sep 26, 2017  9:15 AM CDT   US ABDOMEN COMPLETE with [address] [place] [address] Imaging Center US ([facility])    [address] [place] [id number] [phone]           Please bring a list of your medicines (including vitamins, minerals and over-the-counter drugs). Also, tell your doctor about any allergies you may have. Wear comfortable clothes and leave your valuables at home.  Adults: No eating or drinking for 8 hours before the exam. You may take medicine with a small sip of water.  Children: - Children 6+ years: No food or drink for 6 hours before exam. - Children 1-5 years: No food or drink for 4 hours before exam. - Infants, breast-fed: may have breast milk up to 2 hours before exam. - Infants, formula: may have bottle until 4 hours before exam.  Please call the Imaging Department at your exam site with any questions.            Sep 26, 2017 10:30 AM CDT   (Arrive by 10:15 AM)   Return General Liver with Bienvenido Riley MD   [facility] Hepatology ([facility])    [address] [place] [id number] [phone]              Who to contact     If you have questions or need follow up information about today's clinic  "visit or your schedule please contact [facility] HEPATOLOG[facility] directly at [phone].  Normal or non-critical lab and imaging results will be communicated to you by MyChart, letter or phone within 4 business days after the clinic has received the results. If you do not hear from us within 7 days, please contact the clinic through [employer] or phone. If you have a critical or abnormal lab result, we will notify you by phone as soon as possible.  Submit refill requests through [employer] or call your pharmacy and they will forward the refill request to us. Please allow 3 business days for your refill to be completed.          Additional Information About Your Visit        [employer] Information     [employer] gives you secure access to your electronic health record. If you see a primary care provider, you can also send messages to your care team and make appointments. If you have questions, please call your primary care clinic.  If you do not have a primary care provider, please call [phone] and they will assist you.        Care EveryWhere ID     This is your Care EveryWhere ID. This could be used by other organizations to access your [facility] medical records  [id number]        Your Vitals Were     Pulse Temperature Respirations Height Pulse Oximetry BMI (Body Mass Index)    75 98.5  F (36.9  C) (Oral) 16 1.626 m (5' 4.02\") 100% 23.92 kg/m2       Blood Pressure from Last 3 Encounters:   03/29/17 122/81   03/01/17 117/78   02/13/17 110/76    Weight from Last 3 Encounters:   03/29/17 63.2 kg (139 lb 6.4 oz)   02/28/17 63.5 kg (140 lb)   02/13/17 64.5 kg (142 lb 4.8 oz)              We Performed the Following     Schedule follow up appointments        Primary Care Provider Office Phone # Fax #    Antoinette Avilez [phone]       [facility] [address] [place]  [place] [address]        Thank you!     Thank you for choosing [facility]PATOLOG[facility]  for your care. Our goal is always to provide you " with excellent care. Hearing back from our patients is one way we can continue to improve our services. Please take a few minutes to complete the written survey that you may receive in the mail after your visit with us. Thank you!             Your Updated Medication List - Protect others around you: Learn how to safely use, store and throw away your medicines at www.disposemymeds.org.          This list is accurate as of: 3/29/17 11:59 PM.  Always use your most recent med list.                   Brand Name Dispense Instructions for use    BENADRYL PO      Take 25 mg by mouth every 6 hours as needed       doxepin 10 MG capsule    SINEquan    60 capsule    1 tab by mouth three times daily as needed OR 1-3 tabs by mouth at bedtime       VALIUM 5 MG tablet   Generic drug:  diazepam      Take 5 mg by mouth as needed for anxiety or sleep

## 2017-03-29 NOTE — NURSING NOTE
"Chief Complaint   Patient presents with     RECHECK     alcohol hepatitis       Initial /81 (BP Location: Right arm, Patient Position: Chair, Cuff Size: Adult Regular)  Pulse 75  Temp 98.5  F (36.9  C) (Oral)  Resp 16  Ht 1.626 m (5' 4.02\")  Wt 63.2 kg (139 lb 6.4 oz)  SpO2 100%  BMI 23.92 kg/m2 Estimated body mass index is 23.92 kg/(m^2) as calculated from the following:    Height as of this encounter: 1.626 m (5' 4.02\").    Weight as of this encounter: 63.2 kg (139 lb 6.4 oz).  Medication Reconciliation: complete    "

## 2017-09-14 ENCOUNTER — TELEPHONE (OUTPATIENT)
Dept: GASTROENTEROLOGY | Facility: CLINIC | Age: 47
End: 2017-09-14

## 2017-09-14 NOTE — TELEPHONE ENCOUNTER
Patient contacted and reminded of upcoming appointment.  Patient confirmed they will be attending.  Patient instructed to bring updated medications list to appointment.  Instructed pt to arrive an hour and a half to two hours prior to appt time for labs.  Yaakov Mccauley, CMA

## 2017-09-22 ENCOUNTER — OFFICE VISIT (OUTPATIENT)
Dept: GASTROENTEROLOGY | Facility: CLINIC | Age: 47
End: 2017-09-22
Attending: INTERNAL MEDICINE
Payer: COMMERCIAL

## 2017-09-22 VITALS
DIASTOLIC BLOOD PRESSURE: 77 MMHG | HEART RATE: 68 BPM | WEIGHT: 115.2 LBS | HEIGHT: 64 IN | TEMPERATURE: 98.5 F | SYSTOLIC BLOOD PRESSURE: 120 MMHG | BODY MASS INDEX: 19.67 KG/M2

## 2017-09-22 DIAGNOSIS — M19.90 ARTHRITIS: ICD-10-CM

## 2017-09-22 DIAGNOSIS — K70.9 LIVER DISEASE, CHRONIC, DUE TO ALCOHOL (H): Primary | ICD-10-CM

## 2017-09-22 DIAGNOSIS — K70.30 ALCOHOLIC CIRRHOSIS OF LIVER WITHOUT ASCITES (H): ICD-10-CM

## 2017-09-22 LAB
AFP SERPL-MCNC: 3.3 UG/L (ref 0–8)
ALBUMIN SERPL-MCNC: 4.2 G/DL (ref 3.4–5)
ALP SERPL-CCNC: 83 U/L (ref 40–150)
ALT SERPL W P-5'-P-CCNC: 21 U/L (ref 0–50)
ANION GAP SERPL CALCULATED.3IONS-SCNC: 5 MMOL/L (ref 3–14)
AST SERPL W P-5'-P-CCNC: 16 U/L (ref 0–45)
BILIRUB DIRECT SERPL-MCNC: 0.2 MG/DL (ref 0–0.2)
BILIRUB SERPL-MCNC: 0.5 MG/DL (ref 0.2–1.3)
BUN SERPL-MCNC: 9 MG/DL (ref 7–30)
CALCIUM SERPL-MCNC: 9.3 MG/DL (ref 8.5–10.1)
CHLORIDE SERPL-SCNC: 102 MMOL/L (ref 94–109)
CO2 SERPL-SCNC: 29 MMOL/L (ref 20–32)
CREAT SERPL-MCNC: 0.71 MG/DL (ref 0.52–1.04)
ERYTHROCYTE [DISTWIDTH] IN BLOOD BY AUTOMATED COUNT: 12.8 % (ref 10–15)
GFR SERPL CREATININE-BSD FRML MDRD: 88 ML/MIN/1.7M2
GLUCOSE SERPL-MCNC: 102 MG/DL (ref 70–99)
HCT VFR BLD AUTO: 38.5 % (ref 35–47)
HGB BLD-MCNC: 12.9 G/DL (ref 11.7–15.7)
INR PPP: 1.08 (ref 0.86–1.14)
MCH RBC QN AUTO: 32.8 PG (ref 26.5–33)
MCHC RBC AUTO-ENTMCNC: 33.5 G/DL (ref 31.5–36.5)
MCV RBC AUTO: 98 FL (ref 78–100)
PLATELET # BLD AUTO: 180 10E9/L (ref 150–450)
POTASSIUM SERPL-SCNC: 4.6 MMOL/L (ref 3.4–5.3)
PROT SERPL-MCNC: 7.9 G/DL (ref 6.8–8.8)
RBC # BLD AUTO: 3.93 10E12/L (ref 3.8–5.2)
SODIUM SERPL-SCNC: 136 MMOL/L (ref 133–144)
WBC # BLD AUTO: 5.8 10E9/L (ref 4–11)

## 2017-09-22 PROCEDURE — 82105 ALPHA-FETOPROTEIN SERUM: CPT | Performed by: INTERNAL MEDICINE

## 2017-09-22 PROCEDURE — 36415 COLL VENOUS BLD VENIPUNCTURE: CPT | Performed by: INTERNAL MEDICINE

## 2017-09-22 PROCEDURE — 99213 OFFICE O/P EST LOW 20 MIN: CPT | Mod: ZF

## 2017-09-22 PROCEDURE — 85027 COMPLETE CBC AUTOMATED: CPT | Performed by: INTERNAL MEDICINE

## 2017-09-22 PROCEDURE — 85610 PROTHROMBIN TIME: CPT | Performed by: INTERNAL MEDICINE

## 2017-09-22 PROCEDURE — 80076 HEPATIC FUNCTION PANEL: CPT | Performed by: INTERNAL MEDICINE

## 2017-09-22 PROCEDURE — 80048 BASIC METABOLIC PNL TOTAL CA: CPT | Performed by: INTERNAL MEDICINE

## 2017-09-22 RX ORDER — MULTIVITAMIN,THERAPEUTIC
1 TABLET ORAL DAILY
COMMUNITY

## 2017-09-22 ASSESSMENT — PAIN SCALES - GENERAL: PAINLEVEL: NO PAIN (0)

## 2017-09-22 NOTE — MR AVS SNAPSHOT
After Visit Summary   9/22/2017    Chanda Garvey    MRN: [id number]           Patient Information     Date Of Birth          1970        Visit Information        Provider Department      9/22/2017 10:30 AM Bienvenido Riley MD [facility] Hepatology        Today's Diagnoses     Liver disease, chronic, due to alcohol (H)    -  1    Arthritis           Follow-ups after your visit        Your next 10 appointments already scheduled     Mar 06, 2018  9:30 AM CST   Lab with  LAB   [facility] Lab ([facility])    [address] [place] [address] [id number] [phone]            Mar 06, 2018 10:00 AM CST   US ABDOMEN LIMITED with UCUS1   [facility] US ([facility])    [address] [place] [id number] [phone]           Please bring a list of your medicines (including vitamins, minerals and over-the-counter drugs). Also, tell your doctor about any allergies you may have. Wear comfortable clothes and leave your valuables at home.  Adults: No eating or drinking for 8 hours before the exam. You may take medicine with a small sip of water.  Children: - Children 6+ years: No food or drink for 6 hours before exam. - Children 1-5 years: No food or drink for 4 hours before exam. - Infants, breast-fed: may have breast milk up to 2 hours before exam. - Infants, formula: may have bottle until 4 hours before exam.  Please call the Imaging Department at your exam site with any questions.            Mar 06, 2018 11:00 AM CST   (Arrive by 10:45 AM)   Return General Liver with Bienvenido Riley MD   [facility]atology ([facility])    [address] [place] [address] [id number] [phone]              Future tests that were ordered for you today     Open Future Orders        Priority Expected Expires Ordered    Cortisol serum AM Routine  10/22/2017 9/22/2017    US Abdomen  "Limited* Routine  9/22/2018 9/22/2017    CBC with platelets Routine 2/21/2018 9/23/2018 9/22/2017    Basic metabolic panel Routine  10/22/2017 9/22/2017    Hepatic panel Routine 2/21/2018 9/23/2018 9/22/2017            Who to contact     If you have questions or need follow up information about today's clinic visit or your schedule please contact [facility] HEPATOLOGY directly at [phone].  Normal or non-critical lab and imaging results will be communicated to you by Measurablhart, letter or phone within 4 business days after the clinic has received the results. If you do not hear from us within 7 days, please contact the clinic through Panelflyt or phone. If you have a critical or abnormal lab result, we will notify you by phone as soon as possible.  Submit refill requests through Sailogy or call your pharmacy and they will forward the refill request to us. Please allow 3 business days for your refill to be completed.          Additional Information About Your Visit        Sailogy Information     Sailogy gives you secure access to your electronic health record. If you see a primary care provider, you can also send messages to your care team and make appointments. If you have questions, please call your primary care clinic.  If you do not have a primary care provider, please call [phone] and they will assist you.        Care EveryWhere ID     This is your Care EveryWhere ID. This could be used by other organizations to access your [facility] medical records  [id number]        Your Vitals Were     Pulse Temperature Height BMI (Body Mass Index)          68 98.5  F (36.9  C) (Oral) 1.626 m (5' 4\") 19.77 kg/m2         Blood Pressure from Last 3 Encounters:   09/22/17 120/77   03/29/17 122/81   03/01/17 117/78    Weight from Last 3 Encounters:   09/22/17 52.3 kg (115 lb 3.2 oz)   03/29/17 63.2 kg (139 lb 6.4 oz)   02/28/17 63.5 kg (140 lb)              We Performed the Following     OFFICE CONSULTATION,LEVEL IV        "   Today's Medication Changes          These changes are accurate as of: 9/22/17 11:18 AM.  If you have any questions, ask your nurse or doctor.               Stop taking these medicines if you haven't already. Please contact your care team if you have questions.     BENADRYL PO   Stopped by:  Bienvenido Riley MD           doxepin 10 MG capsule   Commonly known as:  SINEquan   Stopped by:  Bienvenido Riley MD           VALIUM 5 MG tablet   Generic drug:  diazepam   Stopped by:  Bienvenido Riley MD                    Primary Care Provider Office Phone # Fax #    Antoinette Avilez [phone] [address] [place] [address]        Equal Access to Services     [facility]: Hadii ysabel sharp hadasho Soomaali, waaxda luqadaha, qaybta kaalmada adeegyada, tiffany lewis . So [facility] [phone].    ATENCIÓN: Si habla español, tiene a patrick disposición servicios gratuitos de asistencia lingüística. Llame al [phone].    We comply with applicable federal civil rights laws and [place] laws. We do not discriminate on the basis of race, color, national origin, age, disability sex, sexual orientation or gender identity.            Thank you!     Thank you for choosing [facility] HEPATOLOGY  for your care. Our goal is always to provide you with excellent care. Hearing back from our patients is one way we can continue to improve our services. Please take a few minutes to complete the written survey that you may receive in the mail after your visit with us. Thank you!             Your Updated Medication List - Protect others around you: Learn how to safely use, store and throw away your medicines at www.disposemymeds.org.          This list is accurate as of: 9/22/17 11:18 AM.  Always use your most recent med list.                   Brand Name Dispense Instructions for use Diagnosis    multivitamin, therapeutic Tabs tablet      Take 1 tablet by mouth daily        VITAMIN D  (CHOLECALCIFEROL) PO      Take 50,000 Units by mouth once a week

## 2017-09-22 NOTE — NURSING NOTE
"Chief Complaint   Patient presents with     RECHECK     follow up with alcoholic hepatitis, tzimmer cma       Initial /77  Pulse 68  Temp 98.5  F (36.9  C) (Oral)  Ht 1.626 m (5' 4\")  Wt 52.3 kg (115 lb 3.2 oz)  BMI 19.77 kg/m2 Estimated body mass index is 19.77 kg/(m^2) as calculated from the following:    Height as of this encounter: 1.626 m (5' 4\").    Weight as of this encounter: 52.3 kg (115 lb 3.2 oz).  Medication Reconciliation: complete    "

## 2017-09-22 NOTE — PROGRESS NOTES
I had the pleasure of seeing Chanda Garvey for followup in the Liver Clinic at the [facility] on 09/22/2017.  Ms. Garvey returns for followup of alcoholic liver disease.      She has now been abstinent for the past several months, and with that her clinical condition has improved dramatically.  She is no longer jaundiced.  She still does note some hyperpigmentation which may have been related to jaundice.  Her appetite, she describes as voracious, but she still is losing some weight.  She still has difficulties getting her rings on her fingers.      She denies any abdominal pain, itching or skin rash or fatigue.  She denies any increased abdominal girth or lower extremity edema.  She has not had any gastrointestinal bleeding or any overt signs of hepatic encephalopathy.  She denies any fevers or chills, cough or shortness of breath.  She denies any nausea, vomiting, diarrhea or constipation.  There have been no other new events since she was last seen.  She still has swelling of her fingers to the point where she cannot get her rings on, and she has pain and some joint swelling over her second MCP joints.     Current Outpatient Prescriptions   Medication     VITAMIN D, CHOLECALCIFEROL, PO     multivitamin, therapeutic (THERA-VIT) TABS tablet     No current facility-administered medications for this visit.      B/P: 120/77, T: 98.5, P: 68, R: Data Unavailable    HEENT exam shows no scleral icterus and no temporal muscle wasting.  Chest is clear.  Abdominal exam shows no increase in girth.  No masses or tenderness to palpation are present.  Her liver is 10 cm in span without left lobe enlargement.  No spleen tip is palpable, and extremity exam shows no edema.  Skin exam shows no stigmata of chronic liver disease.  Neurologic exam shows no asterixis.     Recent Results (from the past 168 hour(s))    Hepatic Panel [LAB20]    Collection Time: 09/22/17  9:55 AM   Result Value Ref Range     Bilirubin Direct 0.2 0.0 - 0.2 mg/dL    Bilirubin Total 0.5 0.2 - 1.3 mg/dL    Albumin 4.2 3.4 - 5.0 g/dL    Protein Total 7.9 6.8 - 8.8 g/dL    Alkaline Phosphatase 83 40 - 150 U/L    ALT 21 0 - 50 U/L    AST 16 0 - 45 U/L   Basic metabolic panel [LAB15]    Collection Time: 09/22/17  9:55 AM   Result Value Ref Range    Sodium 136 133 - 144 mmol/L    Potassium 4.6 3.4 - 5.3 mmol/L    Chloride 102 94 - 109 mmol/L    Carbon Dioxide 29 20 - 32 mmol/L    Anion Gap 5 3 - 14 mmol/L    Glucose 102 (H) 70 - 99 mg/dL    Urea Nitrogen 9 7 - 30 mg/dL    Creatinine 0.71 0.52 - 1.04 mg/dL    GFR Estimate 88 >60 mL/min/1.7m2    GFR Estimate If Black >90 >60 mL/min/1.7m2    Calcium 9.3 8.5 - 10.1 mg/dL   CBC with platelets [OPE564]    Collection Time: 09/22/17  9:55 AM   Result Value Ref Range    WBC 5.8 4.0 - 11.0 10e9/L    RBC Count 3.93 3.8 - 5.2 10e12/L    Hemoglobin 12.9 11.7 - 15.7 g/dL    Hematocrit 38.5 35.0 - 47.0 %    MCV 98 78 - 100 fl    MCH 32.8 26.5 - 33.0 pg    MCHC 33.5 31.5 - 36.5 g/dL    RDW 12.8 10.0 - 15.0 %    Platelet Count 180 150 - 450 10e9/L   INR [[id number]]    Collection Time: 09/22/17  9:55 AM   Result Value Ref Range    INR 1.08 0.86 - 1.14      My impression is that Ms. Garvey has resolving alcoholic liver disease.  It may be completely resolved.  The major question is whether she has significant scarring or not.  The ultrasound would suggest no.  Her platelet count would also suggest no as well.  Her liver tests are perfectly normal at this point in time, and her hepatomegaly has resolved.  She may have had some degree of alcoholic hepatitis but certainly had alcohol-related fatty liver disease.  She seems committed to long-term sobriety.  My plan will be to see her back in the clinic in 6 months.  I will get an ultrasound and blood work at that time, and we will also do a fibrosis scan.  If none of that suggests that she has advanced scarring, she will only need to see me as needed.      Thank you very  much for allowing me to participate in the care of this patient.  If you have any questions regarding my recommendations, please do not hesitate to contact me.       Bienvenido Riley MD      Professor of Medicine  [employer]      Executive Medical Director, Solid Organ Transplant Program  [facility]

## 2017-09-22 NOTE — LETTER
9/22/2017       RE: Chanda Garvey  [address] [place] [address]     Dear Colleague,    Thank you for referring your patient, Chanda Garvey, to the [facility] HEPATOLOGY at [facility]. Please see a copy of my visit note below.    I had the pleasure of seeing Chanda Garvey for followup in the Liver Clinic at the [facility] on 09/22/2017.  Ms. Garvey returns for followup of alcoholic liver disease.      She has now been abstinent for the past several months, and with that her clinical condition has improved dramatically.  She is no longer jaundiced.  She still does note some hyperpigmentation which may have been related to jaundice.  Her appetite, she describes as voracious, but she still is losing some weight.  She still has difficulties getting her rings on her fingers.      She denies any abdominal pain, itching or skin rash or fatigue.  She denies any increased abdominal girth or lower extremity edema.  She has not had any gastrointestinal bleeding or any overt signs of hepatic encephalopathy.  She denies any fevers or chills, cough or shortness of breath.  She denies any nausea, vomiting, diarrhea or constipation.  There have been no other new events since she was last seen.  She still has swelling of her fingers to the point where she cannot get her rings on, and she has pain and some joint swelling over her second MCP joints.     Current Outpatient Prescriptions   Medication     VITAMIN D, CHOLECALCIFEROL, PO     multivitamin, therapeutic (THERA-VIT) TABS tablet     No current facility-administered medications for this visit.      B/P: 120/77, T: 98.5, P: 68, R: Data Unavailable    HEENT exam shows no scleral icterus and no temporal muscle wasting.  Chest is clear.  Abdominal exam shows no increase in girth.  No masses or tenderness to palpation are present.  Her liver is 10 cm in span without left lobe enlargement.  No spleen  tip is palpable, and extremity exam shows no edema.  Skin exam shows no stigmata of chronic liver disease.  Neurologic exam shows no asterixis.     Recent Results (from the past 168 hour(s))    Hepatic Panel [LAB20]    Collection Time: 09/22/17  9:55 AM   Result Value Ref Range    Bilirubin Direct 0.2 0.0 - 0.2 mg/dL    Bilirubin Total 0.5 0.2 - 1.3 mg/dL    Albumin 4.2 3.4 - 5.0 g/dL    Protein Total 7.9 6.8 - 8.8 g/dL    Alkaline Phosphatase 83 40 - 150 U/L    ALT 21 0 - 50 U/L    AST 16 0 - 45 U/L   Basic metabolic panel [LAB15]    Collection Time: 09/22/17  9:55 AM   Result Value Ref Range    Sodium 136 133 - 144 mmol/L    Potassium 4.6 3.4 - 5.3 mmol/L    Chloride 102 94 - 109 mmol/L    Carbon Dioxide 29 20 - 32 mmol/L    Anion Gap 5 3 - 14 mmol/L    Glucose 102 (H) 70 - 99 mg/dL    Urea Nitrogen 9 7 - 30 mg/dL    Creatinine 0.71 0.52 - 1.04 mg/dL    GFR Estimate 88 >60 mL/min/1.7m2    GFR Estimate If Black >90 >60 mL/min/1.7m2    Calcium 9.3 8.5 - 10.1 mg/dL   CBC with platelets [EHF813]    Collection Time: 09/22/17  9:55 AM   Result Value Ref Range    WBC 5.8 4.0 - 11.0 10e9/L    RBC Count 3.93 3.8 - 5.2 10e12/L    Hemoglobin 12.9 11.7 - 15.7 g/dL    Hematocrit 38.5 35.0 - 47.0 %    MCV 98 78 - 100 fl    MCH 32.8 26.5 - 33.0 pg    MCHC 33.5 31.5 - 36.5 g/dL    RDW 12.8 10.0 - 15.0 %    Platelet Count 180 150 - 450 10e9/L   INR [[id number]]    Collection Time: 09/22/17  9:55 AM   Result Value Ref Range    INR 1.08 0.86 - 1.14      My impression is that Ms. Garvey has resolving alcoholic liver disease.  It may be completely resolved.  The major question is whether she has significant scarring or not.  The ultrasound would suggest no.  Her platelet count would also suggest no as well.  Her liver tests are perfectly normal at this point in time, and her hepatomegaly has resolved.  She may have had some degree of alcoholic hepatitis but certainly had alcohol-related fatty liver disease.  She seems committed to  long-term sobriety.  My plan will be to see her back in the clinic in 6 months.  I will get an ultrasound and blood work at that time, and we will also do a fibrosis scan.  If none of that suggests that she has advanced scarring, she will only need to see me as needed.      Thank you very much for allowing me to participate in the care of this patient.  If you have any questions regarding my recommendations, please do not hesitate to contact me.       Bienvenido Riley MD      Professor of Medicine  [employer] [facility] [employer]      Executive Medical Director, Solid Organ Transplant Program  [facility]

## 2017-10-11 ENCOUNTER — TELEPHONE (OUTPATIENT)
Dept: RHEUMATOLOGY | Facility: CLINIC | Age: 47
End: 2017-10-11

## 2017-10-11 NOTE — TELEPHONE ENCOUNTER
Called patient at [phone] and spoke to patient. Explained hoe the referral process works and patient verbalized understanding. Patient stated that she was unable to talk at this time and stated she will call me back at 3:00 pm today. My direct number given to patient. Awaiting call from patient.   Essie Perry CMA  10/11/2017 12:05 PM

## 2017-10-11 NOTE — TELEPHONE ENCOUNTER
----- Message from Berna Pierre LPN sent at 9/22/2017  2:03 PM CDT -----  Regarding: FW: Referral from Dr. Riley       ----- Message -----     From: Luz Panda     Sent: 9/22/2017  11:19 AM       To: Adult Rheum Triage-[facility]  Subject: Referral from Dr. Osvaldo Tahpa,     This patient was referred to Rheumatology by Dr. Riley on 9/22/17 and can be reached at the home number listed in chart for scheduling    Thank you!  Luz

## 2017-10-11 NOTE — TELEPHONE ENCOUNTER
General Rheumatology Intake Form    1. What is the reason that you are referred to our clinic? Joint pain and swelling    2. What is the name of the doctor and clinic that referred you to us? Bienvenido Riley    3. Have you seen a Rheumatologist in the past?  no    4. Have you been diagnosed with Fibromyalgia? No     4. Who is your primary doctor/primary clinic? Dr Avilez at [facility]    5. Who manages your care for this issue now? None-new issue     6. Have you had any labs or imaging done that pertain to the reason that you are coming here? no     7. Have you seen any specialist related to the reason you are coming here? no     8. Where are we expecting records from? Records in [facility]  Essie Perry [facility]  10/11/2017 3:26 PM

## 2017-10-11 NOTE — LETTER
February 27, 2018    Chanda Garvey  [address] [place] [address]    Dear Chanda,     You are receiving this letter because you were referred to our clinic. We have tried to contact you to set up an appointment with one of our Rheumatologists but were unsuccessful in our attempts. This letter is to inform you that should you decide that you would like to make an appointment in our clinic, please call [phone], Option 2, then Option 3, then ask for Rheumatology staff to set up an appointment. Please understand that we will only keep your records until 3/29/18. If you contact us to make an appointment after 3/29/18, you will need to request those records again.    Thank you,     Adult Rheumatology Staff  [facility]  [phone]

## 2017-10-11 NOTE — TELEPHONE ENCOUNTER
This issue is new to patient and she stated that she has not been seen outside of our system for this issue. Chart is ready for review and placed in the reviewing folder.  Essie Perry CMA  10/11/2017 3:28 PM

## 2017-12-05 ENCOUNTER — TRANSFERRED RECORDS (OUTPATIENT)
Dept: HEALTH INFORMATION MANAGEMENT | Facility: CLINIC | Age: 47
End: 2017-12-05

## 2018-02-07 NOTE — TELEPHONE ENCOUNTER
Called patient's Home number on file [phone] (home) and and left a message X1 to offer an appointment. Awaiting call from patient.  Essie Perry CMA  2/7/2018 10:21 AM

## 2018-02-09 NOTE — TELEPHONE ENCOUNTER
Message sent to Clinic Coordinator to assist with scheduling patient with the next available attending.  Essie Perry CMA  2/9/2018 8:28 AM

## 2018-02-27 NOTE — TELEPHONE ENCOUNTER
Clinic Coordinator attempted to contact patient x3 to schedule an appointment with no success. Letter sent.  Essie Perry CMA  2/27/2018 11:15 AM

## 2018-09-24 ENCOUNTER — RADIANT APPOINTMENT (OUTPATIENT)
Dept: MAMMOGRAPHY | Facility: CLINIC | Age: 48
End: 2018-09-24
Attending: FAMILY MEDICINE
Payer: COMMERCIAL

## 2018-09-24 DIAGNOSIS — Z12.31 SCREENING MAMMOGRAM, ENCOUNTER FOR: ICD-10-CM

## 2019-10-03 ENCOUNTER — HEALTH MAINTENANCE LETTER (OUTPATIENT)
Age: 49
End: 2019-10-03

## 2020-11-07 ENCOUNTER — HEALTH MAINTENANCE LETTER (OUTPATIENT)
Age: 50
End: 2020-11-07

## 2021-01-30 ENCOUNTER — HEALTH MAINTENANCE LETTER (OUTPATIENT)
Age: 51
End: 2021-01-30

## 2021-06-21 ENCOUNTER — OFFICE VISIT (OUTPATIENT)
Dept: FAMILY MEDICINE | Facility: CLINIC | Age: 51
End: 2021-06-21
Payer: COMMERCIAL

## 2021-06-21 VITALS
OXYGEN SATURATION: 100 % | WEIGHT: 129 LBS | BODY MASS INDEX: 22.14 KG/M2 | HEART RATE: 59 BPM | DIASTOLIC BLOOD PRESSURE: 76 MMHG | SYSTOLIC BLOOD PRESSURE: 135 MMHG | TEMPERATURE: 96.4 F

## 2021-06-21 DIAGNOSIS — J02.0 STREP THROAT: Primary | ICD-10-CM

## 2021-06-21 LAB
DEPRECATED S PYO AG THROAT QL EIA: NEGATIVE
SPECIMEN SOURCE: NORMAL
SPECIMEN SOURCE: NORMAL
STREP GROUP A PCR: NOT DETECTED

## 2021-06-21 PROCEDURE — 99213 OFFICE O/P EST LOW 20 MIN: CPT | Performed by: FAMILY MEDICINE

## 2021-06-21 PROCEDURE — 87651 STREP A DNA AMP PROBE: CPT | Performed by: FAMILY MEDICINE

## 2021-06-21 PROCEDURE — 99N1174 PR STATISTIC STREP A RAPID: Performed by: FAMILY MEDICINE

## 2021-06-21 ASSESSMENT — PAIN SCALES - GENERAL: PAINLEVEL: MILD PAIN (2)

## 2021-06-21 NOTE — PATIENT INSTRUCTIONS
Strep negative, culture pending  Ibuprofen 200 to 400 mg every 8 hours as needed, please take with food  Follow if symptoms worsen or fail to improve.

## 2021-06-21 NOTE — PROGRESS NOTES
Assessment & Plan     1. Strep throat  - advised below  Strep negative, culture pending  Ibuprofen 200 to 400 mg every 8 hours as needed, please take with food  Follow if symptoms worsen or fail to improve.  - Streptococcus A Rapid Scr w Reflx to PCR      Return in about 1 week (around 6/28/2021) for sympotms are not improving.    Trevor Mary MD  [facility] ANA Pettit is a 50 year old who presents for the following health issues     HPI     Acute Illness  Acute illness concerns: strep exposure   Onset/Duration: few days  Symptoms:  Fever: no  Chills/Sweats: no  Headache (location?): no  Sinus Pressure: no  Conjunctivitis:  no  Ear Pain: no  Rhinorrhea: no  Congestion: no  Sore Throat: YES  Cough: no  Wheeze: no  Decreased Appetite: YES  Nausea: YES  Vomiting: no  Diarrhea: no  Dysuria/Freq.: no  Dysuria or Hematuria: no  Fatigue/Achiness: YES  Sick/Strep Exposure: YES, son tested positive for strep   Therapies tried and outcome: None      Review of Systems         Objective    There were no vitals taken for this visit.  There is no height or weight on file to calculate BMI.  Physical Exam   /76 (BP Location: Right arm, Patient Position: Chair, Cuff Size: Adult Regular)   Pulse 59   Temp 96.4  F (35.8  C) (Tympanic)   Wt 58.5 kg (129 lb)   SpO2 100%   BMI 22.14 kg/m    GENERAL: healthy, alert and no distress  HENT: ear canals and TM's normal, nose and mouth without ulcers or lesions  NECK: + mild b/l cervical adenopathy  RESP: lungs clear to auscultation - no rales, rhonchi or wheezes  CV: regular rate and rhythm, normal S1 S2    No results found for this or any previous visit (from the past 24 hour(s)).

## 2021-09-11 ENCOUNTER — HEALTH MAINTENANCE LETTER (OUTPATIENT)
Age: 51
End: 2021-09-11

## 2021-12-26 ENCOUNTER — HEALTH MAINTENANCE LETTER (OUTPATIENT)
Age: 51
End: 2021-12-26

## 2022-02-20 ENCOUNTER — HEALTH MAINTENANCE LETTER (OUTPATIENT)
Age: 52
End: 2022-02-20

## 2022-05-02 ENCOUNTER — ANCILLARY PROCEDURE (OUTPATIENT)
Dept: MAMMOGRAPHY | Facility: CLINIC | Age: 52
End: 2022-05-02
Payer: COMMERCIAL

## 2022-05-02 DIAGNOSIS — Z12.31 VISIT FOR SCREENING MAMMOGRAM: ICD-10-CM

## 2022-05-02 PROCEDURE — 77063 BREAST TOMOSYNTHESIS BI: CPT | Mod: GC | Performed by: RADIOLOGY

## 2022-05-02 PROCEDURE — 77067 SCR MAMMO BI INCL CAD: CPT | Mod: GC | Performed by: RADIOLOGY

## 2022-05-29 ASSESSMENT — ENCOUNTER SYMPTOMS
PARESTHESIAS: 0
EYE PAIN: 0
FREQUENCY: 0
ABDOMINAL PAIN: 0
HEADACHES: 0
COUGH: 0
MYALGIAS: 0
PALPITATIONS: 0
HEMATURIA: 0
SORE THROAT: 0
FEVER: 0
BREAST MASS: 0
CHILLS: 0
JOINT SWELLING: 0
NAUSEA: 0
HEARTBURN: 0
DYSURIA: 0
SHORTNESS OF BREATH: 0
HEMATOCHEZIA: 0
WEAKNESS: 0
DIZZINESS: 0
ARTHRALGIAS: 0
NERVOUS/ANXIOUS: 0
CONSTIPATION: 0
DIARRHEA: 0

## 2022-06-01 ENCOUNTER — OFFICE VISIT (OUTPATIENT)
Dept: FAMILY MEDICINE | Facility: CLINIC | Age: 52
End: 2022-06-01
Payer: COMMERCIAL

## 2022-06-01 VITALS
OXYGEN SATURATION: 98 % | BODY MASS INDEX: 21.59 KG/M2 | RESPIRATION RATE: 20 BRPM | HEIGHT: 65 IN | HEART RATE: 71 BPM | SYSTOLIC BLOOD PRESSURE: 118 MMHG | WEIGHT: 129.6 LBS | TEMPERATURE: 98.1 F | DIASTOLIC BLOOD PRESSURE: 74 MMHG

## 2022-06-01 DIAGNOSIS — Z13.1 ENCOUNTER FOR SCREENING FOR DIABETES MELLITUS: ICD-10-CM

## 2022-06-01 DIAGNOSIS — Z12.4 CERVICAL CANCER SCREENING: ICD-10-CM

## 2022-06-01 DIAGNOSIS — E87.1 HYPONATREMIA: ICD-10-CM

## 2022-06-01 DIAGNOSIS — N95.1 PERIMENOPAUSAL SYMPTOMS: ICD-10-CM

## 2022-06-01 DIAGNOSIS — K70.30 ALCOHOLIC CIRRHOSIS OF LIVER WITHOUT ASCITES (H): ICD-10-CM

## 2022-06-01 DIAGNOSIS — K70.0 FATTY LIVER, ALCOHOLIC: ICD-10-CM

## 2022-06-01 DIAGNOSIS — Z00.00 ROUTINE GENERAL MEDICAL EXAMINATION AT A HEALTH CARE FACILITY: Primary | ICD-10-CM

## 2022-06-01 DIAGNOSIS — Z13.220 SCREENING FOR HYPERLIPIDEMIA: ICD-10-CM

## 2022-06-01 DIAGNOSIS — Z11.3 ENCOUNTER FOR SCREENING EXAMINATION FOR SEXUALLY TRANSMITTED DISEASE: ICD-10-CM

## 2022-06-01 DIAGNOSIS — K70.10 ALCOHOLIC HEPATITIS WITHOUT ASCITES (H): ICD-10-CM

## 2022-06-01 DIAGNOSIS — Z12.11 SCREEN FOR COLON CANCER: ICD-10-CM

## 2022-06-01 LAB
CLUE CELLS: ABNORMAL
TRICHOMONAS, WET PREP: ABNORMAL
WBC'S/HIGH POWER FIELD, WET PREP: ABNORMAL
YEAST, WET PREP: ABNORMAL

## 2022-06-01 PROCEDURE — 87624 HPV HI-RISK TYP POOLED RSLT: CPT | Performed by: NURSE PRACTITIONER

## 2022-06-01 PROCEDURE — 99213 OFFICE O/P EST LOW 20 MIN: CPT | Mod: 25 | Performed by: NURSE PRACTITIONER

## 2022-06-01 PROCEDURE — G0145 SCR C/V CYTO,THINLAYER,RESCR: HCPCS | Performed by: NURSE PRACTITIONER

## 2022-06-01 PROCEDURE — 99396 PREV VISIT EST AGE 40-64: CPT | Performed by: NURSE PRACTITIONER

## 2022-06-01 PROCEDURE — 87491 CHLMYD TRACH DNA AMP PROBE: CPT | Performed by: NURSE PRACTITIONER

## 2022-06-01 PROCEDURE — 87210 SMEAR WET MOUNT SALINE/INK: CPT | Performed by: NURSE PRACTITIONER

## 2022-06-01 PROCEDURE — 87591 N.GONORRHOEAE DNA AMP PROB: CPT | Performed by: NURSE PRACTITIONER

## 2022-06-01 ASSESSMENT — ENCOUNTER SYMPTOMS
JOINT SWELLING: 0
HEMATOCHEZIA: 0
MYALGIAS: 0
PALPITATIONS: 0
ARTHRALGIAS: 0
ABDOMINAL PAIN: 0
NAUSEA: 0
EYE PAIN: 0
BREAST MASS: 0
CONSTIPATION: 0
SHORTNESS OF BREATH: 0
DIARRHEA: 0
HEADACHES: 0
HEARTBURN: 0
COUGH: 0
SORE THROAT: 0
HEMATURIA: 0
PARESTHESIAS: 0
DYSURIA: 0
CHILLS: 0
WEAKNESS: 0
FEVER: 0
NERVOUS/ANXIOUS: 0
FREQUENCY: 0
DIZZINESS: 0

## 2022-06-01 NOTE — PROGRESS NOTES
SUBJECTIVE:   CC: Chanda Garvey is an 51 year old woman who presents for preventive health visit.       Patient has been advised of split billing requirements and indicates understanding: Yes     51 year old year old female  with PMH   Patient Active Problem List   Diagnosis Code     CARDIOVASCULAR SCREENING; LDL GOAL LESS THAN 160 Z13.6     Jaundice R17     in clinic for preventive health care exam.     PROBLEMS TO ADD ON    Perimenopause: irregular cycles late for 9-10 weeks; lmp 3/29 last 2 weeks first week heavy then light; hot flashes in the last few weeks; night sweats; irritability, hair thining;       Bilateral hand and feet swelling: hx s/p alcoholic cirrhosis w/hepatomegaly in 3/2017; she refrained from alcohol use with great improvement over 6 months liver enzymes improved and hepatomegaly resolve.                                           US liver Impression: 2017  Resolution of hepatomegaly and sonographic findings of hepatic  steatosis since 2/3/2017. Slight heterogeneity of hepatic echotexture  persists, suggestive of intrinsic parenchymal disease. No focal  hepatic mass.    Healthy Habits:     Getting at least 3 servings of Calcium per day:  Yes    Bi-annual eye exam:  NO    Dental care twice a year:  Yes    Sleep apnea or symptoms of sleep apnea:  None    Diet:  Gluten-free/reduced    Frequency of exercise:  2-3 days/week    Duration of exercise:  45-60 minutes    Taking medications regularly:  Not Applicable    Medication side effects:  Not applicable    PHQ-2 Total Score: 0    Additional concerns today:  No    Today's PHQ-2 Score:   PHQ-2 ( 1999 Pfizer) 6/1/2022   Q1: Little interest or pleasure in doing things 0   Q2: Feeling down, depressed or hopeless 0   PHQ-2 Score 0   Q1: Little interest or pleasure in doing things Not at all   Q2: Feeling down, depressed or hopeless Not at all   PHQ-2 Score 0     Abuse: Current or Past (Physical, Sexual or Emotional) - No  Do you feel safe in  your environment? Yes    Have you ever done Advance Care Planning? (For example, a Health Directive, POLST, or a discussion with a medical provider or your loved ones about your wishes): Yes, advance care planning is on file.    Social History     Tobacco Use     Smoking status: Former Smoker     Packs/day: 1.00     Years: 10.00     Pack years: 10.00     Types: Cigarettes     Quit date: 2001     Years since quittin.0     Smokeless tobacco: Never Used   Substance Use Topics     Alcohol use: No     Comment: 5 drinks per day, sober since 2017     If you drink alcohol do you typically have >3 drinks per day or >7 drinks per week? No    Alcohol Use 2022   Prescreen: >3 drinks/day or >7 drinks/week? -   Prescreen: >3 drinks/day or >7 drinks/week? No   No flowsheet data found.    Reviewed orders with patient.  Reviewed health maintenance and updated orders accordingly - Yes  Labs reviewed in EPIC  BP Readings from Last 3 Encounters:   22 118/74   21 135/76   17 120/77    Wt Readings from Last 3 Encounters:   22 58.8 kg (129 lb 9.6 oz)   21 58.5 kg (129 lb)   17 52.3 kg (115 lb 3.2 oz)         Breast Cancer Screening:  Any new diagnosis of family breast, ovarian, or bowel cancer? No    FHS-7:   Breast CA Risk Assessment (FHS-7) 2022   Did any of your first-degree relatives have breast or ovarian cancer? No   Did any of your relatives have bilateral breast cancer? No   Did any man in your family have breast cancer? No   Did any woman in your family have breast and ovarian cancer? No   Did any woman in your family have breast cancer before age 50 y? No   Do you have 2 or more relatives with breast and/or ovarian cancer? No   Do you have 2 or more relatives with breast and/or bowel cancer? No       Mammogram Screening: Recommended annual mammography  Pertinent mammograms are reviewed under the imaging tab.    History of abnormal Pap smear: NO - age 30-65 PAP every 5  "years with negative HPV co-testing recommended  PAP / HPV Latest Ref Rng & Units 1/26/2017   PAP (Historical) - NIL   HPV16 NEG Negative   HPV18 NEG Negative   HRHPV NEG Negative     Reviewed and updated as needed this visit by clinical staff   Tobacco  Allergies  Meds  Problems  Med Hx  Surg Hx  Fam Hx  Soc   Hx          Reviewed and updated as needed this visit by Provider   Tobacco  Allergies  Meds  Problems  Med Hx  Surg Hx  Fam Hx             Review of Systems   Constitutional: Negative for chills and fever.   HENT: Negative for congestion, ear pain, hearing loss and sore throat.    Eyes: Negative for pain and visual disturbance.   Respiratory: Negative for cough and shortness of breath.    Cardiovascular: Negative for chest pain, palpitations and peripheral edema.   Gastrointestinal: Negative for abdominal pain, constipation, diarrhea, heartburn, hematochezia and nausea.   Breasts:  Negative for tenderness, breast mass and discharge.   Genitourinary: Negative for dysuria, frequency, genital sores, hematuria, pelvic pain, urgency, vaginal bleeding and vaginal discharge.   Musculoskeletal: Negative for arthralgias, joint swelling and myalgias.   Skin: Negative for rash.   Neurological: Negative for dizziness, weakness, headaches and paresthesias.   Psychiatric/Behavioral: Negative for mood changes. The patient is not nervous/anxious.         OBJECTIVE:   /74 (BP Location: Right arm, Patient Position: Sitting, Cuff Size: Adult Regular)   Pulse 71   Temp 98.1  F (36.7  C) (Temporal)   Resp 20   Ht 1.638 m (5' 4.5\")   Wt 58.8 kg (129 lb 9.6 oz)   LMP 03/29/2022   SpO2 98%   BMI 21.90 kg/m       Physical Exam  Constitutional:       General: She is not in acute distress.     Appearance: She is well-developed.   HENT:      Right Ear: Tympanic membrane and external ear normal.      Left Ear: Tympanic membrane and external ear normal.      Nose: Nose normal.      Mouth/Throat:      Pharynx: " No oropharyngeal exudate.   Eyes:      General:         Right eye: No discharge.         Left eye: No discharge.      Conjunctiva/sclera: Conjunctivae normal.      Pupils: Pupils are equal, round, and reactive to light.   Neck:      Thyroid: No thyromegaly.      Trachea: No tracheal deviation.   Cardiovascular:      Rate and Rhythm: Normal rate and regular rhythm.      Pulses: Normal pulses.      Heart sounds: Normal heart sounds, S1 normal and S2 normal. No murmur heard.    No friction rub. No S3 or S4 sounds.   Pulmonary:      Effort: Pulmonary effort is normal. No respiratory distress.      Breath sounds: Normal breath sounds. No wheezing or rales.   Chest:   Breasts:      Right: No mass, nipple discharge or tenderness.      Left: No mass, nipple discharge or tenderness.       Abdominal:      General: Bowel sounds are normal.      Palpations: Abdomen is soft. There is no mass.      Tenderness: There is no abdominal tenderness.   Musculoskeletal:         General: Normal range of motion.      Right upper arm: Edema present.      Left upper arm: Edema present.      Cervical back: Neck supple.      Right lower le+ Edema present.      Left lower le+ Edema present.      Comments: Nonpitting hand edema   Lymphadenopathy:      Cervical: No cervical adenopathy.   Skin:     General: Skin is warm and dry.      Findings: No rash.   Neurological:      Mental Status: She is alert and oriented to person, place, and time.      Motor: No abnormal muscle tone.      Deep Tendon Reflexes: Reflexes are normal and symmetric.   Psychiatric:         Thought Content: Thought content normal.         Judgment: Judgment normal.       Diagnostic Test Results:  Labs reviewed in Epic  Results for orders placed or performed in visit on 22   Pap Screen with HPV - recommended age 30 - 65 years     Status: None   Result Value Ref Range    Interpretation        Negative for Intraepithelial Lesion or Malignancy (NILM)    Comment          Papanicolaou Test Limitations:  Cervical cytology is a screening test with limited sensitivity, and regular screening is critical for cancer prevention.  Pap tests are primarily effective for the diagnosis/prevention of squamous cell carcinoma, not adenocarcinoma or other cancers.        Specimen Adequacy       Satisfactory for evaluation, endocervical/transformation zone component absent    Clinical Information       irregular bleeding      LMP/Menopause Date       3/29/2022      Reflex Testing Yes regardless of result     Previous Abnormal?       No      Performing Labs       The technical component of this testing was completed at [facility]     Wet prep - Clinic Collect     Status: Abnormal    Specimen: Vagina; Swab   Result Value Ref Range    Trichomonas Absent Absent    Yeast Absent Absent    Clue Cells Absent Absent    WBCs/high power field 2+ (A) None   Neisseria gonorrhoeae PCR Swab [[id number]]     Status: Normal    Specimen: Vagina; Swab   Result Value Ref Range    Neisseria gonorrhoeae Negative Negative   Chlamydia trachomatis PCR Swab     Status: Normal    Specimen: Vagina; Swab   Result Value Ref Range    Chlamydia trachomatis Negative Negative       ASSESSMENT/PLAN:   Chanda was seen today for physical and gyn exam.    Diagnoses and all orders for this visit:    Routine general medical examination at a health care facility  Preventative exam w/no abnormalities and/or concerns listed in diagnoses; discussed health maintenance screenings including prostate, breast, cervical and colorectal ca screenings related to gender;  reviewed and reconciled medication, medical history and patient related health concerns  Plan: obtain metabolic labs  -     REVIEW OF HEALTH MAINTENANCE PROTOCOL ORDERS  -     CBC with platelets; Future  -     Comprehensive metabolic panel; Future  -     TSH with free T4 reflex; Future    Screen for colon cancer  Discussed  "options including FIT, Cologuard, colonoscopy  -     COLOGUARD(EXACT SCIENCES)    Screening for hyperlipidemia  -     Lipid panel reflex to direct LDL Fasting; Future    Cervical cancer screening  Pap completed; no abnormal findings; pending result follow up per guidelines   -     Pap Screen with HPV - recommended age 30 - 65 years  -     HPV Hold (Lab Only)    Encounter for screening for diabetes mellitus  -     Hemoglobin A1c; Future    Perimenopausal symptoms  Irregular cycles; hot flashes, mood changes  Discussed trial of selective serotonin reuptake inhibitor, gabapentin; patient declined at this time; will assess labs  - LH  - Prolactin     Encounter for screening examination for sexually transmitted disease  -     Wet prep - Clinic Collect  -     Chlamydia trachomatis PCR Swab; Future  -     Neisseria gonorrhoeae PCR Swab [[id number]]; Future    Alcoholic cirrhosis of liver without ascites (H)  Alcoholic hepatitis without ascites  Fatty liver, alcoholic  - assess LFT's; follow up liver US      Patient has been advised of split billing requirements and indicates understanding: Yes    COUNSELING:  Reviewed preventive health counseling, as reflected in patient instructions    Estimated body mass index is 21.9 kg/m  as calculated from the following:    Height as of this encounter: 1.638 m (5' 4.5\").    Weight as of this encounter: 58.8 kg (129 lb 9.6 oz).      She reports that she quit smoking about 21 years ago. Her smoking use included cigarettes. She has a 10.00 pack-year smoking history. She has never used smokeless tobacco.      Counseling Resources:  ATP IV Guidelines  Pooled Cohorts Equation Calculator  Breast Cancer Risk Calculator  BRCA-Related Cancer Risk Assessment: FHS-7 Tool  FRAX Risk Assessment  ICSI Preventive Guidelines  Dietary Guidelines for Americans, 2010  USDA's MyPlate  ASA Prophylaxis  Lung CA Screening    [facility]  "

## 2022-06-02 LAB
C TRACH DNA SPEC QL NAA+PROBE: NEGATIVE
N GONORRHOEA DNA SPEC QL NAA+PROBE: NEGATIVE

## 2022-06-02 NOTE — RESULT ENCOUNTER NOTE
Hi Hodan,    Great news!  Your recent results are within the expected range. Please continue with your current plan of care to remain healthy.    Let me know if you have any questions or concerns.    Sincerely,  NE Cole CNP

## 2022-06-03 LAB
BKR LAB AP GYN ADEQUACY: NORMAL
BKR LAB AP GYN INTERPRETATION: NORMAL
BKR LAB AP HPV REFLEX: NORMAL
BKR LAB AP LMP: NORMAL
BKR LAB AP PREVIOUS ABNORMAL: NORMAL
PATH REPORT.COMMENTS IMP SPEC: NORMAL
PATH REPORT.COMMENTS IMP SPEC: NORMAL
PATH REPORT.RELEVANT HX SPEC: NORMAL

## 2022-06-07 LAB
HUMAN PAPILLOMA VIRUS 16 DNA: NEGATIVE
HUMAN PAPILLOMA VIRUS 18 DNA: NEGATIVE
HUMAN PAPILLOMA VIRUS FINAL DIAGNOSIS: NORMAL
HUMAN PAPILLOMA VIRUS OTHER HR: NEGATIVE

## 2022-06-10 ENCOUNTER — LAB (OUTPATIENT)
Dept: LAB | Facility: CLINIC | Age: 52
End: 2022-06-10
Payer: COMMERCIAL

## 2022-06-10 DIAGNOSIS — Z00.00 ROUTINE GENERAL MEDICAL EXAMINATION AT A HEALTH CARE FACILITY: ICD-10-CM

## 2022-06-10 DIAGNOSIS — Z13.220 SCREENING FOR HYPERLIPIDEMIA: ICD-10-CM

## 2022-06-10 DIAGNOSIS — Z13.1 ENCOUNTER FOR SCREENING FOR DIABETES MELLITUS: ICD-10-CM

## 2022-06-10 LAB
ALBUMIN SERPL-MCNC: 4.2 G/DL (ref 3.4–5)
ALP SERPL-CCNC: 67 U/L (ref 40–150)
ALT SERPL W P-5'-P-CCNC: 22 U/L (ref 0–50)
ANION GAP SERPL CALCULATED.3IONS-SCNC: 11 MMOL/L (ref 3–14)
AST SERPL W P-5'-P-CCNC: 25 U/L (ref 0–45)
BILIRUB SERPL-MCNC: 0.6 MG/DL (ref 0.2–1.3)
BUN SERPL-MCNC: 8 MG/DL (ref 7–30)
CALCIUM SERPL-MCNC: 9.3 MG/DL (ref 8.5–10.1)
CHLORIDE BLD-SCNC: 96 MMOL/L (ref 94–109)
CHOLEST SERPL-MCNC: 172 MG/DL
CO2 SERPL-SCNC: 22 MMOL/L (ref 20–32)
CREAT SERPL-MCNC: 0.61 MG/DL (ref 0.52–1.04)
ERYTHROCYTE [DISTWIDTH] IN BLOOD BY AUTOMATED COUNT: 11.8 % (ref 10–15)
FASTING STATUS PATIENT QL REPORTED: YES
GFR SERPL CREATININE-BSD FRML MDRD: >90 ML/MIN/1.73M2
GLUCOSE BLD-MCNC: 107 MG/DL (ref 70–99)
HBA1C MFR BLD: 5.6 % (ref 0–5.6)
HCT VFR BLD AUTO: 39.5 % (ref 35–47)
HDLC SERPL-MCNC: 37 MG/DL
HGB BLD-MCNC: 13.6 G/DL (ref 11.7–15.7)
LDLC SERPL CALC-MCNC: 122 MG/DL
MCH RBC QN AUTO: 33 PG (ref 26.5–33)
MCHC RBC AUTO-ENTMCNC: 34.4 G/DL (ref 31.5–36.5)
MCV RBC AUTO: 96 FL (ref 78–100)
NONHDLC SERPL-MCNC: 135 MG/DL
NONINV COLON CA DNA+OCC BLD SCRN STL QL: NEGATIVE
PLATELET # BLD AUTO: 240 10E3/UL (ref 150–450)
POTASSIUM BLD-SCNC: 4.6 MMOL/L (ref 3.4–5.3)
PROT SERPL-MCNC: 7.7 G/DL (ref 6.8–8.8)
RBC # BLD AUTO: 4.12 10E6/UL (ref 3.8–5.2)
SODIUM SERPL-SCNC: 129 MMOL/L (ref 133–144)
TRIGL SERPL-MCNC: 63 MG/DL
TSH SERPL DL<=0.005 MIU/L-ACNC: 1.65 MU/L (ref 0.4–4)
WBC # BLD AUTO: 6.6 10E3/UL (ref 4–11)

## 2022-06-10 PROCEDURE — 85027 COMPLETE CBC AUTOMATED: CPT

## 2022-06-10 PROCEDURE — 36415 COLL VENOUS BLD VENIPUNCTURE: CPT

## 2022-06-10 PROCEDURE — 80053 COMPREHEN METABOLIC PANEL: CPT

## 2022-06-10 PROCEDURE — 84443 ASSAY THYROID STIM HORMONE: CPT

## 2022-06-10 PROCEDURE — 83036 HEMOGLOBIN GLYCOSYLATED A1C: CPT

## 2022-06-10 PROCEDURE — 80061 LIPID PANEL: CPT

## 2022-06-20 NOTE — RESULT ENCOUNTER NOTE
Dear Hodan,     All test are normal except the following require additional follow up    --Your cholesterol is borderline high, but your overall heart disease risk score is low at 1.4%.  Continue exercise and heart healthy Mediterranean-style diet rich in fish, olive oil, whole grains, vegetables and low in animal fats and processed foods to reduce your risk of heart disease.      -Glucose is slight elevated and may be a sign of early diabetes (prediabetes). ADVISE:: eating a low carbohydrate diet, exercising, trying to lose weight (if necessary) and rechecking your glucose level in 12 months.      -Sodium is decreased.  ADVISE: rechecking this in 1 month. Please schedule a lab only appointment via [employer] or call [phone] for assistance.         Please let me know if you have any questions or concerns.     Regards,  NE Cole CNP

## 2022-09-16 ENCOUNTER — VIRTUAL VISIT (OUTPATIENT)
Dept: FAMILY MEDICINE | Facility: CLINIC | Age: 52
End: 2022-09-16
Payer: COMMERCIAL

## 2022-09-16 VITALS — BODY MASS INDEX: 21.33 KG/M2 | WEIGHT: 128 LBS | HEIGHT: 65 IN

## 2022-09-16 DIAGNOSIS — F17.200 SMOKER: ICD-10-CM

## 2022-09-16 DIAGNOSIS — U07.1 CLINICAL DIAGNOSIS OF COVID-19: Primary | ICD-10-CM

## 2022-09-16 PROCEDURE — 99213 OFFICE O/P EST LOW 20 MIN: CPT | Mod: CS | Performed by: NURSE PRACTITIONER

## 2022-09-16 ASSESSMENT — ENCOUNTER SYMPTOMS
HEADACHES: 1
CHEST TIGHTNESS: 1
MYALGIAS: 1
SHORTNESS OF BREATH: 1
CHILLS: 1
NAUSEA: 1
DIARRHEA: 1
SORE THROAT: 1

## 2022-09-16 NOTE — PROGRESS NOTES
Hodan is a 51 year old who is being evaluated via a billable video visit.      How would you like to obtain your AVS? MyChart  If the video visit is dropped, the invitation should be resent by: Text to cell phone: [phone]  Will anyone else be joining your video visit? No        Assessment & Plan     Clinical diagnosis of COVID-19  Meets criteria for antiviral treatment based on smoking status and worsening symptoms. Does not meet it based on Massbp criteria. Discussed treatment options with patient. Paxlovid prescribed. Side effects, risks and benefits of medication were discussed with patient. Discussed how and when to take medication. Does not need to hold any medications. Recommended monitoring BP 1-2 times per day while on Paxlovid. Discussed possible robound COVID after taking antiviral. Follow-up with any questions or concerns.     - nirmatrelvir and ritonavir (PAXLOVID) therapy pack; Take 3 tablets by mouth 2 times daily for 5 days (Take 2 Nirmatrelvir tablets and 1 Ritonavir tablet twice daily for 5 days)    Smoker  Current every day smoker.               Patient Instructions   Paxlovid was ordered for COVID treatment. Your script was sent to the pharmacy. Please call them to check on status of medication prior to picking it up.     Possible side effects of Paxlovid: impaired sense of taste, diarrhea, high blood pressure, and muscle aches. Paxlovid can increase risk of liver inflammation and jaundice. Please contact the clinic if you develop any concern signs/symptoms after starting medication.    Monitor blood pressure closely while on Paxlovid if you have hypertension or blood pressure issues.    There is a risk of rebound COVID after taking COVID antivirals. There would be no further treatment necessary if you do develop rebound COVID, but you would be contagious and would need to quarantine again if you retest positive.    Please contact us with any questions or concerns.         Return if symptoms  worsen or fail to improve.    NE Bonilla CNP  [facility] CHENCHO Pettit is a 51 year old, presenting for the following health issues:  Covid Concern      HPI       COVID-19 Symptom Review  How many days ago did these symptoms start? X Tuesday - tested positve yesterday    Are any of the following symptoms significant for you?  New or worsening difficulty breathing? Yes    Please describe what kind of difficulty you are having breathing:Moderate dyspnea (short of breath with ADLs, shortness of breath that limits the ability to walk up one flight of stairs without needing to rest)    Worsening cough? Yes, I am coughing up mucus.    Fever or chills? Yes, I felt feverish or had chills.    Headache: YES - sinus pressure, ear pain    Sore throat: YES    Chest pain: YES - achiness    Diarrhea: YES    Body aches? YES    What treatments has patient tried? Advil, nyquil   Does patient live in a nursing home, group home, or shelter? No  Does patient have a way to get food/medications during quarantined? Yes, I have a friend or family member who can help me.        MASSBP Score 9/16/2022   Age Greater than or equal to 65 years 0   BMI greater than or equal to 35 kg/m2 0   Has Diabetes Mellitus 0   Has Chronic Kidney Disease 0   Has Cardiovascular Disease and 55 years or older 0   Has Chronic Respiratory Disease and 55 years or older 0   Has Hypertension and 55 years or older 0   Is Immunocompromised 0   Is Pregnant 0   Member of BIPOC community (Black/, /, ,  or , or  or [place] Native)  0   MASSBP Score 0   Has the patient had a positive COVID test outside our system?  Yes   What day did symptoms start?  9/13/2022   *She is a smoker.      Additional provider notes: Patient presents virtually via video visit for COVID treatment. Symptoms started 3 days ago. Tested positive for COVID yesterday. States her symptoms  have progressively gotten worse. Chest tightness and fatigue.  is a physician and recommended antiviral medication. He is going to monitor her pulse ox over the weekend.    She is a smoker.     Allergies   Allergen Reactions     Sulfa Drugs Shortness Of Breath, Anaphylaxis and Swelling     Bicillin C-R, Hives     Penicillins Unknown     occurred as young child, pt believes she had hives       Current Outpatient Medications   Medication     multivitamin, therapeutic (THERA-VIT) TABS tablet     nirmatrelvir and ritonavir (PAXLOVID) therapy pack     VITAMIN D, CHOLECALCIFEROL, PO     No current facility-administered medications for this visit.       Past Medical History:   Diagnosis Date     ASCUS of cervix with negative high risk HPV 11/3/2004    resolved     NO ACTIVE PROBLEMS           Review of Systems   Constitutional: Positive for chills.   HENT: Positive for sore throat.    Respiratory: Positive for chest tightness and shortness of breath.    Cardiovascular: Positive for chest pain.   Gastrointestinal: Positive for diarrhea and nausea.   Musculoskeletal: Positive for myalgias.   Neurological: Positive for headaches.            Objective           Vitals:  No vitals were obtained today due to virtual visit.    alert, mild distress and cooperativealert, mild distress and cooperative  PSYCH: Alert and oriented times 3; coherent speech, normal   rate and volume, able to articulate logical thoughts, able   to abstract reason, no tangential thoughts, no hallucinations   or delusions  Her affect is normal and pleasant  RESP: No cough, no audible wheezing, able to talk in full sentences  Remainder of exam unable to be completed due to telephone visits         Phone call duration: 9 minutes  *virtual video call lost connection, finished call via telephone.

## 2022-09-16 NOTE — PATIENT INSTRUCTIONS
Paxlovid was ordered for COVID treatment. Your script was sent to the pharmacy. Please call them to check on status of medication prior to picking it up.     Possible side effects of Paxlovid: impaired sense of taste, diarrhea, high blood pressure, and muscle aches. Paxlovid can increase risk of liver inflammation and jaundice. Please contact the clinic if you develop any concern signs/symptoms after starting medication.    Monitor blood pressure closely while on Paxlovid if you have hypertension or blood pressure issues.    There is a risk of rebound COVID after taking COVID antivirals. There would be no further treatment necessary if you do develop rebound COVID, but you would be contagious and would need to quarantine again if you retest positive.    Please contact us with any questions or concerns.

## 2022-10-29 ENCOUNTER — HEALTH MAINTENANCE LETTER (OUTPATIENT)
Age: 52
End: 2022-10-29

## 2023-09-02 ENCOUNTER — HEALTH MAINTENANCE LETTER (OUTPATIENT)
Age: 53
End: 2023-09-02

## 2024-02-01 ENCOUNTER — ANCILLARY PROCEDURE (OUTPATIENT)
Dept: MAMMOGRAPHY | Facility: CLINIC | Age: 54
End: 2024-02-01
Payer: COMMERCIAL

## 2024-02-01 DIAGNOSIS — Z12.31 VISIT FOR SCREENING MAMMOGRAM: ICD-10-CM

## 2024-02-01 PROCEDURE — 77067 SCR MAMMO BI INCL CAD: CPT | Mod: GC

## 2024-02-01 PROCEDURE — 77063 BREAST TOMOSYNTHESIS BI: CPT | Mod: GC

## 2024-08-21 NOTE — NURSING NOTE
"Chief Complaint   Patient presents with     RECHECK     follow up       Initial /70 mmHg  Pulse 70  Temp(Src) 98  F (36.7  C) (Oral)  Resp 16  Ht 5' 4\" (1.626 m)  Wt 145 lb (65.772 kg)  BMI 24.88 kg/m2  LMP 12/23/2016  Breastfeeding? No Estimated body mass index is 24.88 kg/(m^2) as calculated from the following:    Height as of this encounter: 5' 4\" (1.626 m).    Weight as of this encounter: 145 lb (65.772 kg).  BP completed using cuff size: regular  " Cirrhosis liver diagnosed in 2021, now follow up with PMD currently, last endoscopy with band placed in esophagus in 2022-23 in [facility]

## 2024-09-30 ENCOUNTER — OFFICE VISIT (OUTPATIENT)
Dept: FAMILY MEDICINE | Facility: CLINIC | Age: 54
End: 2024-09-30
Payer: COMMERCIAL

## 2024-09-30 VITALS
RESPIRATION RATE: 15 BRPM | HEART RATE: 77 BPM | SYSTOLIC BLOOD PRESSURE: 122 MMHG | BODY MASS INDEX: 22.02 KG/M2 | WEIGHT: 132.2 LBS | TEMPERATURE: 97.6 F | OXYGEN SATURATION: 100 % | HEIGHT: 65 IN | DIASTOLIC BLOOD PRESSURE: 72 MMHG

## 2024-09-30 DIAGNOSIS — E87.1 HYPONATREMIA: ICD-10-CM

## 2024-09-30 DIAGNOSIS — D75.89 MACROCYTOSIS: ICD-10-CM

## 2024-09-30 DIAGNOSIS — Z00.00 ROUTINE GENERAL MEDICAL EXAMINATION AT A HEALTH CARE FACILITY: Primary | ICD-10-CM

## 2024-09-30 DIAGNOSIS — Z13.220 SCREENING, LIPID: ICD-10-CM

## 2024-09-30 DIAGNOSIS — Z13.1 SCREENING FOR DIABETES MELLITUS: ICD-10-CM

## 2024-09-30 LAB
ALBUMIN SERPL BCG-MCNC: 4.7 G/DL (ref 3.5–5.2)
ALP SERPL-CCNC: 74 U/L (ref 40–150)
ALT SERPL W P-5'-P-CCNC: 22 U/L (ref 0–50)
ANION GAP SERPL CALCULATED.3IONS-SCNC: 14 MMOL/L (ref 7–15)
AST SERPL W P-5'-P-CCNC: 36 U/L (ref 0–45)
BILIRUB SERPL-MCNC: 0.4 MG/DL
BUN SERPL-MCNC: 4 MG/DL (ref 6–20)
CALCIUM SERPL-MCNC: 9.8 MG/DL (ref 8.8–10.4)
CHLORIDE SERPL-SCNC: 93 MMOL/L (ref 98–107)
CHOLEST SERPL-MCNC: 188 MG/DL
CREAT SERPL-MCNC: 0.56 MG/DL (ref 0.51–0.95)
EGFRCR SERPLBLD CKD-EPI 2021: >90 ML/MIN/1.73M2
ERYTHROCYTE [DISTWIDTH] IN BLOOD BY AUTOMATED COUNT: 11.6 % (ref 10–15)
EST. AVERAGE GLUCOSE BLD GHB EST-MCNC: 88 MG/DL
FASTING STATUS PATIENT QL REPORTED: YES
FASTING STATUS PATIENT QL REPORTED: YES
GLUCOSE SERPL-MCNC: 95 MG/DL (ref 70–99)
HBA1C MFR BLD: 4.7 % (ref 0–5.6)
HCO3 SERPL-SCNC: 23 MMOL/L (ref 22–29)
HCT VFR BLD AUTO: 41.6 % (ref 35–47)
HDLC SERPL-MCNC: 39 MG/DL
HGB BLD-MCNC: 14 G/DL (ref 11.7–15.7)
LDLC SERPL CALC-MCNC: 134 MG/DL
MCH RBC QN AUTO: 34.9 PG (ref 26.5–33)
MCHC RBC AUTO-ENTMCNC: 33.7 G/DL (ref 31.5–36.5)
MCV RBC AUTO: 104 FL (ref 78–100)
NONHDLC SERPL-MCNC: 149 MG/DL
PLATELET # BLD AUTO: 194 10E3/UL (ref 150–450)
POTASSIUM SERPL-SCNC: 4.1 MMOL/L (ref 3.4–5.3)
PROT SERPL-MCNC: 7.6 G/DL (ref 6.4–8.3)
RBC # BLD AUTO: 4.01 10E6/UL (ref 3.8–5.2)
SODIUM SERPL-SCNC: 130 MMOL/L (ref 135–145)
TRIGL SERPL-MCNC: 73 MG/DL
WBC # BLD AUTO: 5.3 10E3/UL (ref 4–11)

## 2024-09-30 PROCEDURE — 90636 HEP A/HEP B VACC ADULT IM: CPT | Performed by: STUDENT IN AN ORGANIZED HEALTH CARE EDUCATION/TRAINING PROGRAM

## 2024-09-30 PROCEDURE — 90750 HZV VACC RECOMBINANT IM: CPT | Performed by: STUDENT IN AN ORGANIZED HEALTH CARE EDUCATION/TRAINING PROGRAM

## 2024-09-30 PROCEDURE — 36415 COLL VENOUS BLD VENIPUNCTURE: CPT | Performed by: STUDENT IN AN ORGANIZED HEALTH CARE EDUCATION/TRAINING PROGRAM

## 2024-09-30 PROCEDURE — 80053 COMPREHEN METABOLIC PANEL: CPT | Performed by: STUDENT IN AN ORGANIZED HEALTH CARE EDUCATION/TRAINING PROGRAM

## 2024-09-30 PROCEDURE — 99396 PREV VISIT EST AGE 40-64: CPT | Mod: 25 | Performed by: STUDENT IN AN ORGANIZED HEALTH CARE EDUCATION/TRAINING PROGRAM

## 2024-09-30 PROCEDURE — 83036 HEMOGLOBIN GLYCOSYLATED A1C: CPT | Performed by: STUDENT IN AN ORGANIZED HEALTH CARE EDUCATION/TRAINING PROGRAM

## 2024-09-30 PROCEDURE — 90472 IMMUNIZATION ADMIN EACH ADD: CPT | Performed by: STUDENT IN AN ORGANIZED HEALTH CARE EDUCATION/TRAINING PROGRAM

## 2024-09-30 PROCEDURE — 80061 LIPID PANEL: CPT | Performed by: STUDENT IN AN ORGANIZED HEALTH CARE EDUCATION/TRAINING PROGRAM

## 2024-09-30 PROCEDURE — 90471 IMMUNIZATION ADMIN: CPT | Performed by: STUDENT IN AN ORGANIZED HEALTH CARE EDUCATION/TRAINING PROGRAM

## 2024-09-30 PROCEDURE — 85027 COMPLETE CBC AUTOMATED: CPT | Performed by: STUDENT IN AN ORGANIZED HEALTH CARE EDUCATION/TRAINING PROGRAM

## 2024-09-30 SDOH — HEALTH STABILITY: PHYSICAL HEALTH: ON AVERAGE, HOW MANY DAYS PER WEEK DO YOU ENGAGE IN MODERATE TO STRENUOUS EXERCISE (LIKE A BRISK WALK)?: 5 DAYS

## 2024-09-30 ASSESSMENT — SOCIAL DETERMINANTS OF HEALTH (SDOH): HOW OFTEN DO YOU GET TOGETHER WITH FRIENDS OR RELATIVES?: TWICE A WEEK

## 2024-09-30 ASSESSMENT — PAIN SCALES - GENERAL: PAINLEVEL: NO PAIN (0)

## 2024-09-30 NOTE — NURSING NOTE
Prior to immunization administration, verified patients identity using patient s name and date of birth. Please see Immunization Activity for additional information.     Screening Questionnaire for Adult Immunization    Are you sick today?   No   Do you have allergies to medications, food, a vaccine component or latex?   Yes   Have you ever had a serious reaction after receiving a vaccination?   No   Do you have a long-term health problem with heart, lung, kidney, or metabolic disease (e.g., diabetes), asthma, a blood disorder, no spleen, complement component deficiency, a cochlear implant, or a spinal fluid leak?  Are you on long-term aspirin therapy?   No   Do you have cancer, leukemia, HIV/AIDS, or any other immune system problem?   No   Do you have a parent, brother, or sister with an immune system problem?   No   In the past 3 months, have you taken medications that affect  your immune system, such as prednisone, other steroids, or anticancer drugs; drugs for the treatment of rheumatoid arthritis, Crohn s disease, or psoriasis; or have you had radiation treatments?   No   Have you had a seizure, or a brain or other nervous system problem?   No   During the past year, have you received a transfusion of blood or blood    products, or been given immune (gamma) globulin or antiviral drug?   No   For women: Are you pregnant or is there a chance you could become       pregnant during the next month?   No   Have you received any vaccinations in the past 4 weeks?   No     Immunization questionnaire was positive for at least one answer.  Notified Jin.      Patient instructed to remain in clinic for 15 minutes afterwards, and to report any adverse reactions.     Screening performed by Pepe Conner MA on 9/30/2024 at 9:29 AM.

## 2024-09-30 NOTE — PROGRESS NOTES
Preventive Care Visit  [facility]  Jin Glover PA-C, Physician Assistant - Medical  Sep 30, 2024      Assessment & Plan     Routine general medical examination at a health care facility  Immunizations: Twinrix and Shringrix - given today.   Mental Health: No concerns.   STI Screening: Declined.   Hearing and Vision: No concerns   Dental Health: No concerns; follows dentist regularly.   Preventative Labs: Below labs ordered.   Skin Cancer: No personal/family history of skin cancer. No concerning skin lesions. Recommended utilization of sunscreen SPF 50 when outside with reapplication every 2 hours.  Colon Cancer: Due on 2025 (doing cologuards)  Lung Cancer (screen 55-80 high risk): Former smoker, 7.5 year smoking history (.75 PPD x10 year history). Does not need screening.   Osteoporosis: Not due.     Discussed getting at least 150 minutes of aerobic exercise weekly, healthy diet with focus on 4-5 serving of fruits/veggies, lean meat, and reducing saturated fats/sugars, and getting 7-8 hours of sleep nightly (should feel rested when waking up or not getting enough).    GYNECOLOGIC HISTORY  Gynecologic History:   No LMP recorded. Patient is perimenopausal.  Contraception: Hx of tubal ligation.   Cervical Cancer Screening: Due on 6/10/2027.  Breast Cancer Screening: Completed 2024; plan for yearly screenings.     Obstetric History  OB History    Para Term  AB Living   2 1 1 0 0 1   SAB IAB Ectopic Multiple Live Births   0 0 0 0 1      # Outcome Date GA Lbr Pio/2nd Weight Sex Type Anes PTL Lv   2  / 41w0d 24:00 4.252 kg (9 lb 6 oz) F CS   DEEDEE      Birth Comments: arrest of dilation      Name: Vy   1 Term                - ZOSTER RECOMBINANT ADJUVANTED (SHINGRIX)  - REVIEW OF HEALTH MAINTENANCE PROTOCOL ORDERS  - CBC with platelets; Future  - Lipid panel reflex to direct LDL Fasting; Future  - Comprehensive metabolic panel (BMP + Alb, Alk Phos, ALT,  AST, Total. Bili, TP); Future  - Hemoglobin A1c; Future  - HEP A & B (TWINRIX)    Screening, lipid  - Lipid panel reflex to direct LDL Fasting; Future    Screening for diabetes mellitus  - Hemoglobin A1c; Future      Counseling  Appropriate preventive services were addressed with this patient via screening, questionnaire, or discussion as appropriate for fall prevention, nutrition, physical activity, Tobacco-use cessation, social engagement, weight loss and cognition.  Checklist reviewing preventive services available has been given to the patient.  Reviewed patient's diet, addressing concerns and/or questions.     Subjective   Hodan is a 53 year old, presenting for the following:  Physical (Wanting shingles vaccine )        9/30/2024     8:22 AM   Additional Questions   Roomed by Raegan PARDO   Accompanied by self         9/30/2024     8:22 AM   Patient Reported Additional Medications   Patient reports taking the following new medications None        Health Care Directive  Patient does not have a Health Care Directive or Living Will: Patient states has Advance Directive and will bring in a copy to clinic.    HPI  Here for preventative visit.               9/30/2024   General Health   How would you rate your overall physical health? Excellent   Feel stress (tense, anxious, or unable to sleep) Not at all            9/30/2024   Nutrition   Three or more servings of calcium each day? Yes   Diet: Carbohydrate counting   How many servings of fruit and vegetables per day? (!) 2-3   How many sweetened beverages each day? 0-1            9/30/2024   Exercise   Days per week of moderate/strenous exercise 5 days            9/30/2024   Social Factors   Frequency of gathering with friends or relatives Twice a week   Worry food won't last until get money to buy more No   Food not last or not have enough money for food? No   Do you have housing? (Housing is defined as stable permanent housing and does not include staying ouside in a  car, in a tent, in an abandoned building, in an overnight shelter, or couch-surfing.) Yes   Are you worried about losing your housing? No   Lack of transportation? No   Unable to get utilities (heat,electricity)? No            2024   Fall Risk   Fallen 2 or more times in the past year? No   Trouble with walking or balance? No             2024   Dental   Dentist two times every year? Yes            2024   TB Screening   Were you born outside of the [place]? Yes            Today's PHQ-2 Score:       2024     8:16 AM   PHQ-2 (  [employer])   Q1: Little interest or pleasure in doing things 0   Q2: Feeling down, depressed or hopeless 0   PHQ-2 Score 0   Q1: Little interest or pleasure in doing things Not at all   Q2: Feeling down, depressed or hopeless Not at all   PHQ-2 Score 0           2024   Substance Use   Alcohol more than 3/day or more than 7/wk No   Do you use any other substances recreationally? No        Social History     Tobacco Use    Smoking status: Former     Current packs/day: 0.00     Average packs/day: 1 pack/day for 10.0 years (10.0 ttl pk-yrs)     Types: Cigarettes     Start date: 1991     Quit date: 2001     Years since quittin.4    Smokeless tobacco: Never   Vaping Use    Vaping status: Never Used   Substance Use Topics    Alcohol use: No     Comment: 5 drinks per day, sober since 2017    Drug use: No           2024   LAST FHS-7 RESULTS   1st degree relative breast or ovarian cancer No   Any relative bilateral breast cancer No   Any male have breast cancer No   Any ONE woman have BOTH breast AND ovarian cancer No   Any woman with breast cancer before 50yrs No   2 or more relatives with breast AND/OR ovarian cancer No   2 or more relatives with breast AND/OR bowel cancer No                   2024   STI Screening   New sexual partner(s) since last STI/HIV test? No        History of abnormal Pap smear: No - age 30- 64 PAP with HPV every 5 years  "recommended        Latest Ref Rng & Units 6/1/2022     3:27 PM 1/26/2017     1:30 PM 1/26/2017     1:21 PM   PAP / HPV   PAP  Negative for Intraepithelial Lesion or Malignancy (NILM)      PAP (Historical)    NIL    HPV 16 DNA Negative Negative  Negative     HPV 18 DNA Negative Negative  Negative     Other HR HPV Negative Negative  Negative       ASCVD Risk   The 10-year ASCVD risk score (Lulu RODRIGUEZ, et al., 2019) is: 1.8%    Values used to calculate the score:      Age: 53 years      Sex: Female      Is Non- : No      Diabetic: No      Tobacco smoker: No      Systolic Blood Pressure: 122 mmHg      Is BP treated: No      HDL Cholesterol: 37 mg/dL      Total Cholesterol: 172 mg/dL           Reviewed and updated as needed this visit by Provider   Tobacco  Allergies  Meds  Problems  Med Hx  Surg Hx  Fam Hx     Sexual Activity                   Objective    Exam  /72 (BP Location: Right arm, Patient Position: Sitting, Cuff Size: Adult Regular)   Pulse 77   Temp 97.6  F (36.4  C) (Temporal)   Resp 15   Ht 1.655 m (5' 5.16\")   Wt 60 kg (132 lb 3.2 oz)   SpO2 100%   BMI 21.89 kg/m     Estimated body mass index is 21.89 kg/m  as calculated from the following:    Height as of this encounter: 1.655 m (5' 5.16\").    Weight as of this encounter: 60 kg (132 lb 3.2 oz).    Physical Exam  GENERAL: alert and no distress  EYES: Eyes grossly normal to inspection, PERRL and conjunctivae and sclerae normal  HENT: ear canals and TM's normal, nose and mouth without ulcers or lesions  NECK: no adenopathy, no asymmetry, masses, or scars. No thyromegaly or nodules palpated.  RESP: lungs clear to auscultation - no rales, rhonchi or wheezes  CV: regular rate and rhythm, normal S1 S2, no S3 or S4, no murmur, click or rub, no peripheral edema  ABDOMEN: soft, nontender, no hepatosplenomegaly, no masses and bowel sounds normal  MS: no gross musculoskeletal defects noted, no edema  SKIN: no " suspicious lesions or rashes  NEURO: Normal strength and tone, mentation intact and speech normal  PSYCH: mentation appears normal, affect normal/bright          Signed Electronically by: Jin Glover PA-C

## 2024-09-30 NOTE — PATIENT INSTRUCTIONS
Patient Education   Preventive Care Advice   This is general advice given by our system to help you stay healthy. However, your care team may have specific advice just for you. Please talk to your care team about your preventive care needs.  Nutrition  Eat 5 or more servings of fruits and vegetables each day.  Try wheat bread, brown rice and whole grain pasta (instead of white bread, rice, and pasta).  Get enough calcium and vitamin D. Check the label on foods and aim for 100% of the RDA (recommended daily allowance).  Lifestyle  Exercise at least 150 minutes each week  (30 minutes a day, 5 days a week).  Do muscle strengthening activities 2 days a week. These help control your weight and prevent disease.  No smoking.  Wear sunscreen to prevent skin cancer.  Have a dental exam and cleaning every 6 months.  Yearly exams  See your health care team every year to talk about:  Any changes in your health.  Any medicines your care team has prescribed.  Preventive care, family planning, and ways to prevent chronic diseases.  Shots (vaccines)   HPV shots (up to age 26), if you've never had them before.  Hepatitis B shots (up to age 59), if you've never had them before.  COVID-19 shot: Get this shot when it's due.  Flu shot: Get a flu shot every year.  Tetanus shot: Get a tetanus shot every 10 years.  Pneumococcal, hepatitis A, and RSV shots: Ask your care team if you need these based on your risk.  Shingles shot (for age 50 and up)  General health tests  Diabetes screening:  Starting at age 35, Get screened for diabetes at least every 3 years.  If you are younger than age 35, ask your care team if you should be screened for diabetes.  Cholesterol test: At age 39, start having a cholesterol test every 5 years, or more often if advised.  Bone density scan (DEXA): At age 50, ask your care team if you should have this scan for osteoporosis (brittle bones).  Hepatitis C: Get tested at least once in your life.  STIs (sexually  transmitted infections)  Before age 24: Ask your care team if you should be screened for STIs.  After age 24: Get screened for STIs if you're at risk. You are at risk for STIs (including HIV) if:  You are sexually active with more than one person.  You don't use condoms every time.  You or a partner was diagnosed with a sexually transmitted infection.  If you are at risk for HIV, ask about PrEP medicine to prevent HIV.  Get tested for HIV at least once in your life, whether you are at risk for HIV or not.  Cancer screening tests  Cervical cancer screening: If you have a cervix, begin getting regular cervical cancer screening tests starting at age 21.  Breast cancer scan (mammogram): If you've ever had breasts, begin having regular mammograms starting at age 40. This is a scan to check for breast cancer.  Colon cancer screening: It is important to start screening for colon cancer at age 45.  Have a colonoscopy test every 10 years (or more often if you're at risk) Or, ask your provider about stool tests like a FIT test every year or Cologuard test every 3 years.  To learn more about your testing options, visit:   .  For help making a decision, visit:   https://bit.ly/au50188.  Prostate cancer screening test: If you have a prostate, ask your care team if a prostate cancer screening test (PSA) at age 55 is right for you.  Lung cancer screening: If you are a current or former smoker ages 50 to 80, ask your care team if ongoing lung cancer screenings are right for you.  For informational purposes only. Not to replace the advice of your health care provider. Copyright   2023 [employer]. All rights reserved. Clinically reviewed by the [facility] Transitions Program. [employer] [id number] - REV 01/24.